# Patient Record
Sex: FEMALE | Race: BLACK OR AFRICAN AMERICAN | NOT HISPANIC OR LATINO
[De-identification: names, ages, dates, MRNs, and addresses within clinical notes are randomized per-mention and may not be internally consistent; named-entity substitution may affect disease eponyms.]

---

## 2019-06-02 ENCOUNTER — TRANSCRIPTION ENCOUNTER (OUTPATIENT)
Age: 59
End: 2019-06-02

## 2019-06-03 ENCOUNTER — OUTPATIENT (OUTPATIENT)
Dept: OUTPATIENT SERVICES | Facility: HOSPITAL | Age: 59
LOS: 1 days | End: 2019-06-03
Payer: COMMERCIAL

## 2019-06-03 VITALS
OXYGEN SATURATION: 100 % | HEIGHT: 61 IN | DIASTOLIC BLOOD PRESSURE: 79 MMHG | TEMPERATURE: 98 F | HEART RATE: 94 BPM | WEIGHT: 119.49 LBS | SYSTOLIC BLOOD PRESSURE: 136 MMHG | RESPIRATION RATE: 15 BRPM

## 2019-06-03 VITALS
OXYGEN SATURATION: 98 % | HEART RATE: 97 BPM | RESPIRATION RATE: 20 BRPM | SYSTOLIC BLOOD PRESSURE: 117 MMHG | DIASTOLIC BLOOD PRESSURE: 76 MMHG

## 2019-06-03 DIAGNOSIS — E11.3521 TYPE 2 DIABETES MELLITUS WITH PROLIFERATIVE DIABETIC RETINOPATHY WITH TRACTION RETINAL DETACHMENT INVOLVING THE MACULA, RIGHT EYE: ICD-10-CM

## 2019-06-03 DIAGNOSIS — Z95.5 PRESENCE OF CORONARY ANGIOPLASTY IMPLANT AND GRAFT: Chronic | ICD-10-CM

## 2019-06-03 DIAGNOSIS — H43.11 VITREOUS HEMORRHAGE, RIGHT EYE: ICD-10-CM

## 2019-06-03 DIAGNOSIS — Z98.49 CATARACT EXTRACTION STATUS, UNSPECIFIED EYE: Chronic | ICD-10-CM

## 2019-06-03 LAB — GLUCOSE BLDC GLUCOMTR-MCNC: 130 MG/DL — HIGH (ref 70–99)

## 2019-06-03 PROCEDURE — C1889: CPT

## 2019-06-03 PROCEDURE — 67113 REPAIR RETINAL DETACH CPLX: CPT | Mod: RT

## 2019-06-03 PROCEDURE — C9257: CPT

## 2019-06-03 PROCEDURE — 82962 GLUCOSE BLOOD TEST: CPT

## 2019-06-03 PROCEDURE — C1814: CPT

## 2019-06-03 RX ORDER — BEVACIZUMAB 400 MG/16ML
0.05 INJECTION, SOLUTION INTRAVENOUS ONCE
Refills: 0 | Status: DISCONTINUED | OUTPATIENT
Start: 2019-06-03 | End: 2019-06-18

## 2019-06-03 NOTE — ASU DISCHARGE PLAN (ADULT/PEDIATRIC) - PROCEDURE
right eye pars plana vitrectomy, injection of avastin, endolaser, membrane peel, injection of silicone oil

## 2019-06-03 NOTE — ASU DISCHARGE PLAN (ADULT/PEDIATRIC) - CALL YOUR DOCTOR IF YOU HAVE ANY OF THE FOLLOWING:
Pain not relieved by Medications/Nausea and vomiting that does not stop/Bleeding that does not stop/Swelling that gets worse/Fever greater than (need to indicate Fahrenheit or Celsius)

## 2019-06-03 NOTE — ASU DISCHARGE PLAN (ADULT/PEDIATRIC) - NURSING INSTRUCTIONS
Do not rub the eye. Tylenol or extra strength tylenol if needed for discomfort, avoid   Advil, Motrin, Aleve and aspirin to minimize bleeding. Appointment with Dr. Norman on 6/4/19 @ 10:30am

## 2019-06-03 NOTE — ASU DISCHARGE PLAN (ADULT/PEDIATRIC) - CARE PROVIDER_API CALL
Baldemar Norman)  Ophthalmology  [address] [place] [id number]  Phone: [phone]  Fax: [phone]  Follow Up Time:

## 2019-09-05 ENCOUNTER — APPOINTMENT (OUTPATIENT)
Dept: OPHTHALMOLOGY | Facility: CLINIC | Age: 59
End: 2019-09-05
Payer: COMMERCIAL

## 2019-09-05 ENCOUNTER — NON-APPOINTMENT (OUTPATIENT)
Age: 59
End: 2019-09-05

## 2019-09-05 PROCEDURE — 66821 AFTER CATARACT LASER SURGERY: CPT | Mod: RT

## 2019-09-06 PROBLEM — I21.9 ACUTE MYOCARDIAL INFARCTION, UNSPECIFIED: Chronic | Status: ACTIVE | Noted: 2019-06-03

## 2019-09-06 PROBLEM — I10 ESSENTIAL (PRIMARY) HYPERTENSION: Chronic | Status: ACTIVE | Noted: 2019-06-03

## 2019-09-06 PROBLEM — E78.5 HYPERLIPIDEMIA, UNSPECIFIED: Chronic | Status: ACTIVE | Noted: 2019-06-03

## 2021-01-21 ENCOUNTER — EMERGENCY (EMERGENCY)
Facility: HOSPITAL | Age: 61
LOS: 1 days | Discharge: HOME | End: 2021-01-21
Attending: EMERGENCY MEDICINE
Payer: COMMERCIAL

## 2021-01-21 VITALS
SYSTOLIC BLOOD PRESSURE: 156 MMHG | RESPIRATION RATE: 18 BRPM | HEIGHT: 61 IN | WEIGHT: 119.93 LBS | TEMPERATURE: 98 F | HEART RATE: 92 BPM | OXYGEN SATURATION: 97 % | DIASTOLIC BLOOD PRESSURE: 85 MMHG

## 2021-01-21 DIAGNOSIS — Z98.49 CATARACT EXTRACTION STATUS, UNSPECIFIED EYE: Chronic | ICD-10-CM

## 2021-01-21 DIAGNOSIS — Z95.5 PRESENCE OF CORONARY ANGIOPLASTY IMPLANT AND GRAFT: Chronic | ICD-10-CM

## 2021-01-21 LAB
ALBUMIN SERPL ELPH-MCNC: 3.4 G/DL — SIGNIFICANT CHANGE UP (ref 3.3–5)
ALP SERPL-CCNC: 114 U/L — SIGNIFICANT CHANGE UP (ref 40–120)
ALT FLD-CCNC: 17 U/L — SIGNIFICANT CHANGE UP (ref 10–45)
ANION GAP SERPL CALC-SCNC: 8 MMOL/L — SIGNIFICANT CHANGE UP (ref 5–17)
APTT BLD: 30.1 SEC — SIGNIFICANT CHANGE UP (ref 27.5–35.5)
AST SERPL-CCNC: 19 U/L — SIGNIFICANT CHANGE UP (ref 10–40)
BASE EXCESS BLDV CALC-SCNC: 1 MMOL/L — SIGNIFICANT CHANGE UP (ref -2–2)
BASOPHILS # BLD AUTO: 0.06 K/UL — SIGNIFICANT CHANGE UP (ref 0–0.2)
BASOPHILS NFR BLD AUTO: 0.7 % — SIGNIFICANT CHANGE UP (ref 0–2)
BILIRUB SERPL-MCNC: 0.3 MG/DL — SIGNIFICANT CHANGE UP (ref 0.2–1.2)
BUN SERPL-MCNC: 19 MG/DL — SIGNIFICANT CHANGE UP (ref 7–23)
CALCIUM SERPL-MCNC: 8.8 MG/DL — SIGNIFICANT CHANGE UP (ref 8.4–10.5)
CHLORIDE SERPL-SCNC: 105 MMOL/L — SIGNIFICANT CHANGE UP (ref 96–108)
CO2 BLDV-SCNC: 28 MMOL/L — SIGNIFICANT CHANGE UP (ref 22–30)
CO2 SERPL-SCNC: 26 MMOL/L — SIGNIFICANT CHANGE UP (ref 22–31)
CREAT SERPL-MCNC: 0.88 MG/DL — SIGNIFICANT CHANGE UP (ref 0.5–1.3)
EOSINOPHIL # BLD AUTO: 0.2 K/UL — SIGNIFICANT CHANGE UP (ref 0–0.5)
EOSINOPHIL NFR BLD AUTO: 2.3 % — SIGNIFICANT CHANGE UP (ref 0–6)
GLUCOSE BLDC GLUCOMTR-MCNC: 255 MG/DL — HIGH (ref 70–99)
GLUCOSE BLDC GLUCOMTR-MCNC: 280 MG/DL — HIGH (ref 70–99)
GLUCOSE SERPL-MCNC: 234 MG/DL — HIGH (ref 70–99)
HCO3 BLDV-SCNC: 26 MMOL/L — SIGNIFICANT CHANGE UP (ref 21–29)
HCT VFR BLD CALC: 40.1 % — SIGNIFICANT CHANGE UP (ref 34.5–45)
HGB BLD-MCNC: 13.1 G/DL — SIGNIFICANT CHANGE UP (ref 11.5–15.5)
IMM GRANULOCYTES NFR BLD AUTO: 0.5 % — SIGNIFICANT CHANGE UP (ref 0–1.5)
INR BLD: 0.96 RATIO — SIGNIFICANT CHANGE UP (ref 0.88–1.16)
LYMPHOCYTES # BLD AUTO: 3.12 K/UL — SIGNIFICANT CHANGE UP (ref 1–3.3)
LYMPHOCYTES # BLD AUTO: 35.9 % — SIGNIFICANT CHANGE UP (ref 13–44)
MCHC RBC-ENTMCNC: 28.1 PG — SIGNIFICANT CHANGE UP (ref 27–34)
MCHC RBC-ENTMCNC: 32.7 GM/DL — SIGNIFICANT CHANGE UP (ref 32–36)
MCV RBC AUTO: 86.1 FL — SIGNIFICANT CHANGE UP (ref 80–100)
MONOCYTES # BLD AUTO: 0.37 K/UL — SIGNIFICANT CHANGE UP (ref 0–0.9)
MONOCYTES NFR BLD AUTO: 4.3 % — SIGNIFICANT CHANGE UP (ref 2–14)
NEUTROPHILS # BLD AUTO: 4.89 K/UL — SIGNIFICANT CHANGE UP (ref 1.8–7.4)
NEUTROPHILS NFR BLD AUTO: 56.3 % — SIGNIFICANT CHANGE UP (ref 43–77)
NRBC # BLD: 0 /100 WBCS — SIGNIFICANT CHANGE UP (ref 0–0)
PCO2 BLDV: 47 MMHG — SIGNIFICANT CHANGE UP (ref 35–50)
PH BLDV: 7.36 — SIGNIFICANT CHANGE UP (ref 7.35–7.45)
PLATELET # BLD AUTO: 275 K/UL — SIGNIFICANT CHANGE UP (ref 150–400)
PO2 BLDV: 37 MMHG — SIGNIFICANT CHANGE UP (ref 25–45)
POTASSIUM SERPL-MCNC: 4.4 MMOL/L — SIGNIFICANT CHANGE UP (ref 3.5–5.3)
POTASSIUM SERPL-SCNC: 4.4 MMOL/L — SIGNIFICANT CHANGE UP (ref 3.5–5.3)
PROT SERPL-MCNC: 6.7 G/DL — SIGNIFICANT CHANGE UP (ref 6–8.3)
PROTHROM AB SERPL-ACNC: 11.5 SEC — SIGNIFICANT CHANGE UP (ref 10.6–13.6)
RBC # BLD: 4.66 M/UL — SIGNIFICANT CHANGE UP (ref 3.8–5.2)
RBC # FLD: 13.2 % — SIGNIFICANT CHANGE UP (ref 10.3–14.5)
SAO2 % BLDV: 66 % — LOW (ref 67–88)
SARS-COV-2 RNA SPEC QL NAA+PROBE: SIGNIFICANT CHANGE UP
SODIUM SERPL-SCNC: 139 MMOL/L — SIGNIFICANT CHANGE UP (ref 135–145)
TROPONIN T, HIGH SENSITIVITY RESULT: 19 NG/L — SIGNIFICANT CHANGE UP (ref 0–51)
TROPONIN T, HIGH SENSITIVITY RESULT: 22 NG/L — SIGNIFICANT CHANGE UP (ref 0–51)
WBC # BLD: 8.68 K/UL — SIGNIFICANT CHANGE UP (ref 3.8–10.5)
WBC # FLD AUTO: 8.68 K/UL — SIGNIFICANT CHANGE UP (ref 3.8–10.5)

## 2021-01-21 PROCEDURE — 70496 CT ANGIOGRAPHY HEAD: CPT | Mod: 26,MA

## 2021-01-21 PROCEDURE — 99285 EMERGENCY DEPT VISIT HI MDM: CPT

## 2021-01-21 PROCEDURE — 99283 EMERGENCY DEPT VISIT LOW MDM: CPT | Mod: GC

## 2021-01-21 PROCEDURE — 93010 ELECTROCARDIOGRAM REPORT: CPT

## 2021-01-21 PROCEDURE — 71045 X-RAY EXAM CHEST 1 VIEW: CPT | Mod: 26

## 2021-01-21 PROCEDURE — 70498 CT ANGIOGRAPHY NECK: CPT | Mod: 26,MA

## 2021-01-21 RX ORDER — INSULIN LISPRO 100/ML
1 VIAL (ML) SUBCUTANEOUS ONCE
Refills: 0 | Status: COMPLETED | OUTPATIENT
Start: 2021-01-21 | End: 2021-01-21

## 2021-01-21 RX ORDER — GLUCAGON INJECTION, SOLUTION 0.5 MG/.1ML
1 INJECTION, SOLUTION SUBCUTANEOUS ONCE
Refills: 0 | Status: DISCONTINUED | OUTPATIENT
Start: 2021-01-21 | End: 2021-01-25

## 2021-01-21 RX ORDER — MECLIZINE HCL 12.5 MG
25 TABLET ORAL EVERY 8 HOURS
Refills: 0 | Status: DISCONTINUED | OUTPATIENT
Start: 2021-01-21 | End: 2021-01-25

## 2021-01-21 RX ORDER — INSULIN LISPRO 100/ML
VIAL (ML) SUBCUTANEOUS
Refills: 0 | Status: DISCONTINUED | OUTPATIENT
Start: 2021-01-21 | End: 2021-01-25

## 2021-01-21 RX ORDER — DEXTROSE 50 % IN WATER 50 %
25 SYRINGE (ML) INTRAVENOUS ONCE
Refills: 0 | Status: DISCONTINUED | OUTPATIENT
Start: 2021-01-21 | End: 2021-01-25

## 2021-01-21 RX ORDER — DEXTROSE 50 % IN WATER 50 %
12.5 SYRINGE (ML) INTRAVENOUS ONCE
Refills: 0 | Status: DISCONTINUED | OUTPATIENT
Start: 2021-01-21 | End: 2021-01-25

## 2021-01-21 RX ORDER — ATORVASTATIN CALCIUM 80 MG/1
80 TABLET, FILM COATED ORAL AT BEDTIME
Refills: 0 | Status: DISCONTINUED | OUTPATIENT
Start: 2021-01-21 | End: 2021-01-25

## 2021-01-21 RX ORDER — SODIUM CHLORIDE 9 MG/ML
1000 INJECTION, SOLUTION INTRAVENOUS
Refills: 0 | Status: DISCONTINUED | OUTPATIENT
Start: 2021-01-21 | End: 2021-01-25

## 2021-01-21 RX ORDER — MECLIZINE HCL 12.5 MG
50 TABLET ORAL ONCE
Refills: 0 | Status: COMPLETED | OUTPATIENT
Start: 2021-01-21 | End: 2021-01-21

## 2021-01-21 RX ORDER — DEXTROSE 50 % IN WATER 50 %
15 SYRINGE (ML) INTRAVENOUS ONCE
Refills: 0 | Status: DISCONTINUED | OUTPATIENT
Start: 2021-01-21 | End: 2021-01-25

## 2021-01-21 RX ORDER — SODIUM CHLORIDE 9 MG/ML
3 INJECTION INTRAMUSCULAR; INTRAVENOUS; SUBCUTANEOUS EVERY 8 HOURS
Refills: 0 | Status: DISCONTINUED | OUTPATIENT
Start: 2021-01-21 | End: 2021-01-25

## 2021-01-21 RX ORDER — ASPIRIN/CALCIUM CARB/MAGNESIUM 324 MG
81 TABLET ORAL DAILY
Refills: 0 | Status: DISCONTINUED | OUTPATIENT
Start: 2021-01-21 | End: 2021-01-25

## 2021-01-21 RX ORDER — CLOPIDOGREL BISULFATE 75 MG/1
75 TABLET, FILM COATED ORAL DAILY
Refills: 0 | Status: DISCONTINUED | OUTPATIENT
Start: 2021-01-21 | End: 2021-01-25

## 2021-01-21 RX ORDER — AMLODIPINE BESYLATE 2.5 MG/1
5 TABLET ORAL DAILY
Refills: 0 | Status: DISCONTINUED | OUTPATIENT
Start: 2021-01-21 | End: 2021-01-25

## 2021-01-21 RX ORDER — LISINOPRIL 2.5 MG/1
40 TABLET ORAL DAILY
Refills: 0 | Status: DISCONTINUED | OUTPATIENT
Start: 2021-01-21 | End: 2021-01-25

## 2021-01-21 RX ADMIN — SODIUM CHLORIDE 3 MILLILITER(S): 9 INJECTION INTRAMUSCULAR; INTRAVENOUS; SUBCUTANEOUS at 21:08

## 2021-01-21 RX ADMIN — Medication 1 UNIT(S): at 21:07

## 2021-01-21 RX ADMIN — Medication 50 MILLIGRAM(S): at 14:07

## 2021-01-21 NOTE — ED PROVIDER NOTE - NEUROLOGICAL SPEECH
Patient Education        Back Stretches: Exercises  Your Care Instructions  Here are some examples of exercises for stretching your back. Start each exercise slowly. Ease off the exercise if you start to have pain. Your doctor or physical therapist will tell you when you can start these exercises and which ones will work best for you. How to do the exercises  Overhead stretch    1. Stand comfortably with your feet shoulder-width apart. 2. Looking straight ahead, raise both arms over your head and reach toward the ceiling. Do not allow your head to tilt back. 3. Hold for 15 to 30 seconds, then lower your arms to your sides. 4. Repeat 2 to 4 times. Side stretch    1. Stand comfortably with your feet shoulder-width apart. 2. Raise one arm over your head, and then lean to the other side. 3. Slide your hand down your leg as you let the weight of your arm gently stretch your side muscles. Hold for 15 to 30 seconds. 4. Repeat 2 to 4 times on each side. Press-up    1. Lie on your stomach, supporting your body with your forearms. 2. Press your elbows down into the floor to raise your upper back. As you do this, relax your stomach muscles and allow your back to arch without using your back muscles. As your press up, do not let your hips or pelvis come off the floor. 3. Hold for 15 to 30 seconds, then relax. 4. Repeat 2 to 4 times. Relax and rest    1. Lie on your back with a rolled towel under your neck and a pillow under your knees. Extend your arms comfortably to your sides. 2. Relax and breathe normally. 3. Remain in this position for about 10 minutes. 4. If you can, do this 2 or 3 times each day. Follow-up care is a key part of your treatment and safety. Be sure to make and go to all appointments, and call your doctor if you are having problems. It's also a good idea to know your test results and keep a list of the medicines you take. Where can you learn more?   Go to rotation with towel    5. Hold a towel above and behind your head with the arm that is not sore. 6. With your sore arm, reach behind your back and grasp the towel. 7. With the arm above your head, pull the towel upward. Do this until you feel a stretch on the front and outside of your sore shoulder. 8. Hold 15 to 30 seconds. 9. Repeat 2 to 4 times. Shoulder blade squeeze    5. Stand with your arms at your sides, and squeeze your shoulder blades together. Do not raise your shoulders up as you squeeze. 6. Hold 6 seconds. 7. Repeat 8 to 12 times. Resisted rows    For this exercise, you will need elastic exercise material, such as surgical tubing or Thera-Band. 1. Put the band around a solid object at about waist level. (A bedpost will work well.) Each hand should hold an end of the band. 2. With your elbows at your sides and bent to 90 degrees, pull the band back. Your shoulder blades should move toward each other. Return to the starting position. 3. Repeat 8 to 12 times. External rotator strengthening exercise    1. Start by tying a piece of elastic exercise material to a doorknob. You can use surgical tubing or Thera-Band. (You may also hold one end of the band in each hand.)  2. Stand or sit with your shoulder relaxed and your elbow bent 90 degrees. Your upper arm should rest comfortably against your side. Squeeze a rolled towel between your elbow and your body for comfort. This will help keep your arm at your side. 3. Hold one end of the elastic band with the hand of the painful arm. 4. Start with your forearm across your belly. Slowly rotate the forearm out away from your body. Keep your elbow and upper arm tucked against the towel roll or the side of your body until you begin to feel tightness in your shoulder. Slowly move your arm back to where you started. 5. Repeat 8 to 12 times. Internal rotator strengthening exercise    1. Start by tying a piece of elastic exercise material to a doorknob.  You clear

## 2021-01-21 NOTE — ED PROVIDER NOTE - PHYSICAL EXAMINATION
Gen: Well appearing, NAD  Head: NCAT  HEENT: PERRL, MMM, normal conjunctiva, anicteric, neck supple  Lung: CTAB, no adventitious sounds  CV: RRR, no murmurs  Abd: soft, NTND, no rebound or guarding, no CVAT  MSK: No edema, no visible deformities  Neuro: R eye strabismus (chronic per pt, no vision in eye) otherwise CN II-XII grossly intact. 5/5 strength and normal sensation in all extremities. Unable to heel/toe walk, falling to R  Skin: Warm and dry, no evidence of rash Gen: Well appearing, NAD  Head: NCAT  HEENT: PERRL, MMM, normal conjunctiva, anicteric, neck supple  Lung: CTAB, no adventitious sounds  CV: RRR, no murmurs  Abd: soft, NTND, no rebound or guarding, no CVAT  MSK: No edema, no visible deformities  Neuro: R eye strabismus (chronic per pt, no vision in eye) otherwise CN II-XII grossly intact. 5/5 strength and normal sensation in all extremities. Unable to heel/toe walk, falling to R  Skin: Warm and dry, no evidence of rash  **ATTENDING ADDENDUM (Dr. Bradley Singh): I have reviewed and substantially contributed to the elements of the PE as documented above. I have directly performed an examination of this patient in conjunction with the other members (EM resident/PA/NP) of the patient care team. I have personally reviewed the patient's vital signs at the time of the patient's initial presentation to the ED and repeatedly throughout the ED course. Gen: Well appearing, NAD  Head: NCAT  HEENT: PERRL, MMM, normal conjunctiva, anicteric, neck supple  Lung: CTAB, no adventitious sounds  CV: RRR, no murmurs  Abd: soft, NTND, no rebound or guarding, no CVAT  MSK: No edema, no visible deformities  Neuro: R eye strabismus (chronic per pt, no vision in eye) otherwise CN II-XII grossly intact. 5/5 strength and normal sensation in all extremities. Unable to heel/toe walk, falling to R [**ATTENDING ADDENDUM (Dr. Bradley Singh): please see clarification re: CN 2-4, CN 6, and CN 7, below, in ED attending addendum]   Skin: Warm and dry, no evidence of rash  **ATTENDING ADDENDUM (Dr. Bradley Singh): I have reviewed and substantially contributed to the elements of the PE as documented above. I have directly performed an examination of this patient in conjunction with the other members (EM resident/PA/NP) of the patient care team. I have personally reviewed the patient's vital signs at the time of the patient's initial presentation to the ED and repeatedly throughout the ED course. Of note, and in addition to the above, the patient has a chronic right eyelid ptosis from prior surgical intervention. Mild ?right sided droop? (chronic?)

## 2021-01-21 NOTE — ED CDU PROVIDER INITIAL DAY NOTE - EYE ANTERIOR CHAMBER, RIGHT
**ATTENDING ADDENDUM (Dr. Bradley Singh): unable to assess/evaluate **CDU ATTENDING ADDENDUM (Dr. Bradley Singh): unable to assess/evaluate **CDU ATTENDING ADDENDUM (Dr. Bradley Singh): NO hyphema or hypopion.

## 2021-01-21 NOTE — ED CDU PROVIDER INITIAL DAY NOTE - OBJECTIVE STATEMENT
61yo F hx htn hld dm CAD s/p stent, R "retinal surgery" p/w insidious onset loss of balance (feels self falling to R) since 4pm yesterday, worsens w/ movement, and [facility] and sent to ED. Also notes that she has been having productive cough recently. No fever,chills, cp, sob, abd pain, n/v/d. Thinks her R arm/leg may be weaker.    In the ED VSS.  Labs unremarkable.  CT head, CTA head and neck negative for acute pathology.  Neuro recommending observation in the CDU overnight for MRI. 61yo F hx htn hld dm CAD s/p stent, R "retinal surgery" p/w insidious onset loss of balance (feels self falling to R) since 4pm yesterday, [facility]sens w/ movement[facility] Also notes that she has been having productive cough recently. No fever,chills, cp, sob, abd pain, n/v/d. Thinks her R arm/leg may be weaker.  In the ED VSS.  Labs unremarkable.  CT head, CTA head and neck negative for acute pathology.  Neuro recommending observation in the CDU overnight for MRI.  **CDU ATTENDING ADDENDUM (Dr. Bradley Singh): I attest that I have directly examined this patient and elicited a comparable history of present illness and review of systems with my collaborating provider (NP/PA). I attest that I have made significant contributions to the documentation where necessary and as noted in the EMR. Of note, and in addition to the above, I was the initial ED Provider of record for this patient's presentation prior to the CDU admission. Please refer to ED Provider Note for additional details.

## 2021-01-21 NOTE — ED PROVIDER NOTE - EYE ANTERIOR CHAMBER, RIGHT
**ATTENDING ADDENDUM (Dr. Bradley Singh): unable to assess/evaluate **ATTENDING ADDENDUM (Dr. Bradley Singh): NO obvious hyphema or hypopion.

## 2021-01-21 NOTE — ED CDU PROVIDER INITIAL DAY NOTE - EYE EXTRAOCULAR MOVEMENT, RIGHT
**ATTENDING ADDENDUM (Dr. Bradley Singh): chronic esotropia/DISCONJUGATE GAZE **CDU ATTENDING ADDENDUM (Dr. Bradley Singh): chronic esotropia/DISCONJUGATE GAZE

## 2021-01-21 NOTE — ED PROVIDER NOTE - EYE, RIGHT
**ATTENDING ADDENDUM (Dr. Bradley Singh): chronic scarring, ?blindness **ATTENDING ADDENDUM (Dr. 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

## 2021-01-21 NOTE — ED PROVIDER NOTE - UNABLE TO OBTAIN
**ATTENDING ADDENDUM (Dr. Bradley Singh): Exploration of CC, HPI and review of systems limited by patient's acuity (CODE STROKE/STROKE TEAM ACTIVATION) Urgent need for Intervention

## 2021-01-21 NOTE — ED PROVIDER NOTE - PLAN OF CARE
**ATTENDING ADDENDUM (Dr. Bradley Singh): Goals of care include resolution of emergent/urgent symptoms and concerns, and restoration to baseline level of homeostasis.

## 2021-01-21 NOTE — ED ADULT NURSE NOTE - NSIMPLEMENTINTERV_GEN_ALL_ED
Implemented All Fall Risk Interventions:  [place] to call system. Call bell, personal items and telephone within reach. Instruct patient to call for assistance. Room bathroom lighting operational. Non-slip footwear when patient is off stretcher. Physically safe environment: no spills, clutter or unnecessary equipment. Stretcher in lowest position, wheels locked, appropriate side rails in place. Provide visual cue, wrist band, yellow gown, etc. Monitor gait and stability. Monitor for mental status changes and reorient to person, place, and time. Review medications for side effects contributing to fall risk. Reinforce activity limits and safety measures with patient and family.

## 2021-01-21 NOTE — ED CDU PROVIDER INITIAL DAY NOTE - PROGRESS NOTE DETAILS
Patient reports that she wants to leave the hospital.  She states that she is hungry and want to be closer to her family.  Patient offered sandwich, but still requesting to be discharged. Explained risks of leaving, including worsening neurologic deficits and death.  Patient understands and accepts these risk and would still like to leave AMA. -Everardo Barron PA-C Patient states that she would like to stay in the hospital after receiving a sandwich. Patient no other complaints. Endorses unsteady gait. Neuro exam: A+Ox3, EOMI, PERRL, equal strength b/l UE and LE, sensation equal b/l.  Patient's home medications: Metformin 500mg BID, Glipizide BID, Atorvastatin 80mg, Ramipril 10mg, ASA 81mg, Clopidogrel 75mg, Amlodipine 5mg. - iCelo Iqbal PA-C Spoke with neurology, no need for permissive HTN at this time, OK with giving patient home medications for gradual decline in BP. - ADALI LopezC Patient states that she would like to stay in the hospital after receiving a sandwich. Patient no other complaints. Endorses unsteady gait.  A+Ox3, R eye deviated post surgical and able to move. L eye EOMI. L pupil responsive to light. Strength 5/5 B/l UE and B/l LE, sensation intact.  Patient's home medications: Metformin 500mg BID, Glipizide BID, Atorvastatin 80mg, Ramipril 10mg, ASA 81mg, Clopidogrel 75mg, Amlodipine 5mg. - Cielo Iqbal PA-C **CDU ATTENDING ADDENDUM (Dr. Bradley Singh): patient serially evaluated throughout CDU course by CDU team. Personally assessed. Currently resting comfortably. NO acute deterioration up to this time in the CDU. Will continue to observe and monitor closely. Will reassess later in shift.

## 2021-01-21 NOTE — ED PROVIDER NOTE - ATTENDING CONTRIBUTION TO CARE
**ATTENDING ADDENDUM (Dr. Bradley Singh): I attest that I have directly examined this patient and reviewed and formulated the diagnostic and therapeutic management plan in collaboration with the EM resident. Please see MDM note and remainder of EMR for findings from CC, HPI, ROS, and PE. (Tonio)

## 2021-01-21 NOTE — ED CDU PROVIDER DISPOSITION NOTE - NSFOLLOWUPINSTRUCTIONS_ED_ALL_ED_FT
1. Rest. Stay hydrated.   2. Continue your current home medications. You can take Meclizine 25mg as needed up to three time a day for dizziness.   3. Follow-up with your medical doctor in 2-3 days.  Bring your results with you for follow-up.  4. Return to the ER if you have any new or worsening symptoms, falls,  chest pain, difficulty breathing, fevers, chills, weakness, or any other concerns.      ***NEURO RECCS 1. Rest. Stay hydrated.   2. Continue your current home medications.  3. Follow-up with your medical doctor in 2-3 days.  Bring your results with you for follow-up.      Follow up with neurology, Dr. Joe upon discharge    Thomas Joe)  Neurology; Vascular Neurology  [address] [facility] [address], [address]  [place] [address]  Phone: [phone]  Fax: [phone]    4. Return to the ER if you have any new or worsening symptoms, falls,  chest pain, difficulty breathing, fevers, chills, weakness, or any other concerns.

## 2021-01-21 NOTE — ED PROVIDER NOTE - EYE VISUAL FIELD, RIGHT
diminished **ATTENDING ADDENDUM (Dr. Bradley Singh): unable to assess/evaluate/diminished **ATTENDING ADDENDUM (Dr. Bradley Singh): POSITIVE chronic right eye blindness./diminished

## 2021-01-21 NOTE — ED PROVIDER NOTE - PRINCIPAL DIAGNOSIS
Fibroid uterus    H/O lumpectomy  left  History of incisional hernia repair    History of kidney transplant  left  History of Mohs surgery for squamous cell carcinoma of skin  recent left arm procedure-multiple Moh's procedure in the past Imbalance

## 2021-01-21 NOTE — ED CDU PROVIDER INITIAL DAY NOTE - EYE, RIGHT
**ATTENDING ADDENDUM (Dr. Bradley Singh): chronic scarring, ?blindness **CDU ATTENDING ADDENDUM (Dr. Bradley Singh): chronic scarring, ?blindness **CDU ATTENDING ADDENDUM (Dr. Bradley Singh): chronic right eye blindness

## 2021-01-21 NOTE — CONSULT NOTE ADULT - SUBJECTIVE AND OBJECTIVE BOX
Neurology  Consult Note  01-21-21    Name:  NANCY BRUSH; 60y (1960)    Reason for Admission:     Chief Complaint:  Vertigo    HPI:  61yo AA woman with a PMHx of HTN HLD DM CAD s/p stents, R retinal detachment s/p corrective surgery, presents with complaints of loss of balance of insidious onset w/o focal weakness or sensory changes. Poor historian. Patient reports that internal sense of dizziness began 48hrs ago. Denies vertiginous symptoms. States that symptoms worsen with head movement. Intermittent. Imbalance prompted  visit who sent patient to the ED.   In the ED, VSS, labs grossly WNL, CTH/CTA show no acute disease processes.   Patient currently denies fevers, chills, ns, cp, sob, abd pain, n/v/d/c, weakness, or changes to weight or senses.     Review of Systems:  As states in HPI.    Allergies:  eggs (Unknown)      PMHx:   HTN (hypertension)    HLD (hyperlipidemia)    Heart attack      PFHx:     PSuHx:   Cataract extraction status    Stented coronary artery      PSoHx:     Marital Status:  (   )    (   ) Single    (   )    (  )   Occupation:   Lives with: (  ) alone  (  ) children   (  ) spouse   (  ) parents  (  ) other  Recent Travel:     Substance Use (street drugs): (  ) never used  (  ) other:  Tobacco Usage:  (   ) never smoked   (   ) former smoker   (   ) current smoker  (     ) pack year  Alcohol Usage:  Sexual History:         Medications:  MEDICATIONS  (STANDING):    MEDICATIONS  (PRN):    Vitals:  T(C): 36.6 (01-21-21 @ 13:27), Max: 36.6 (01-21-21 @ 12:36)  HR: 87 (01-21-21 @ 14:07) (82 - 92)  BP: 171/97 (01-21-21 @ 14:07) (150/83 - 171/97)  RR: 15 (01-21-21 @ 14:07) (15 - 20)  SpO2: 100% (01-21-21 @ 14:07) (97% - 100%)    Labs:                        13.1   8.68  )-----------( 275      ( 21 Jan 2021 13:10 )             40.1     01-21    139  |  105  |  19  ----------------------------<  234<H>  4.4   |  26  |  0.88    Ca    8.8      21 Jan 2021 13:10    TPro  6.7  /  Alb  3.4  /  TBili  0.3  /  DBili  x   /  AST  19  /  ALT  17  /  AlkPhos  114  01-21    CAPILLARY BLOOD GLUCOSE      POCT Blood Glucose.: 221 mg/dL (21 Jan 2021 12:52)    LIVER FUNCTIONS - ( 21 Jan 2021 13:10 )  Alb: 3.4 g/dL / Pro: 6.7 g/dL / ALK PHOS: 114 U/L / ALT: 17 U/L / AST: 19 U/L / GGT: x             PT/INR - ( 21 Jan 2021 13:10 )   PT: 11.5 sec;   INR: 0.96 ratio         PTT - ( 21 Jan 2021 13:10 )  PTT:30.1 sec      PHYSICAL EXAMINATION:  General: Well-developed, well nourished, in no acute distress.  Eyes: Conjunctiva and sclera clear.  Neurologic:  - Mental Status:  Alert, awake, oriented to person, place, and time; Speech is fluent with intact naming, repetition, and comprehension; Good overall fund of knowledge;   - Cranial Nerves II-XII:    II:  Right eye surgical, deviated to right, able to move. Left visual fields are full to confrontation; Pupil is round, and reactive to light;  III, IV, VI:  Extraocular movements in L. are intact without nystagmus.  V:  Facial sensation is intact in the V1-V3 distribution bilaterally.  VII:  Face is symmetric with normal eye closure and smile  - Motor:  Strength is 5/5 throughout.  There is no pronator drift.  Normal muscle bulk and tone throughout.  - Reflexes:  2+ and symmetric at the biceps, triceps, brachioradialis, knees, and ankles.  Plantar responses flexor.  - Sensory:  Intact throughout to LT  - Coordination:  Finger-nose-finger and heel-knee-shin intact without dysmetria.  Rapid alternating hand movements intact.      Radiology:  < from: CT Angio Neck w/ IV Cont (01.21.21 @ 13:35) >  IMPRESSION: Old small vessel white matter ischemic changes. 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 No hemorrhage.    Normal CTA of the head and neck.    Dr. Bradley discussed these findings with neurology resident on 1/21/2021 1:25 PM with read back.    MARITN BRADLEY MD, ATTENDING RADIOLOGIST  This document has been electronically signed. Jan 21 2021  1:28PM    < end of copied text >

## 2021-01-21 NOTE — ED CDU PROVIDER INITIAL DAY NOTE - NEUROLOGICAL PRONATOR DRIFT
**ATTENDING ADDENDUM (Dr. Bradley Singh): NO drift./none **CDU ATTENDING ADDENDUM (Dr. Bradley Singh): NO drift./none

## 2021-01-21 NOTE — CONSULT NOTE ADULT - ATTENDING COMMENTS
61yo AA woman with a PMHx of HTN HLD DM CAD s/p stents, R retinal detachment s/p corrective surgery, p/w dizziness and instability with falls. Pt describes sense of imbalance and dizziness but not as room spinning or lightheadedness. Pt walks looking down because she fears tripping on things likes cracks on the ground. On exam she has minimal vision in the right, eye, EOMI, VFF in left eye, no other cranial nerve abnormalities. Strength 5/5 throughout. There is decreased sensation to vibration and proprioception in the toes and up to the knees.   MRI was negative for any new infarcts, did show old pontine infarct and microvascular ischemic disease.   Imbalance and sense of dizziness likely multifactorial due to diabetic peripheral neuropathy and visual impairment from retinal detachment s/p failed reattachment surgery.   Pt counseled on blood pressure and diabetes control to reduce change of further stroke and reduce neuropathy. Contiue on ASA and Plavix.

## 2021-01-21 NOTE — ED PROVIDER NOTE - EYE CORNEA, RIGHT
HAZY/CLOUDY/KERATITIS **ATTENDING ADDENDUM (Dr. Bradley Singh): unable to assess/evaluate/HAZY/CLOUDY/KERATITIS **ATTENDING ADDENDUM (Dr. Bradley Singh): POSITIVE APD, chronic right eye blindness from prior retinal detachment

## 2021-01-21 NOTE — ED CDU PROVIDER INITIAL DAY NOTE - EYE VISUAL FIELD, RIGHT
**ATTENDING ADDENDUM (Dr. Bradley Singh): unable to assess/evaluate/diminished **CDU ATTENDING ADDENDUM (Dr. Bradley Singh): unable to assess/evaluate/diminished **CDU ATTENDING ADDENDUM (Dr. Bradley Singh): POSITIVE APD, POSITIVE chronic right eye blindness/diminished

## 2021-01-21 NOTE — ED CDU PROVIDER INITIAL DAY NOTE - PMH
Heart attack  2015  HLD (hyperlipidemia)    HTN (hypertension)     Blind right eye    Heart attack  2015  HLD (hyperlipidemia)    HTN (hypertension)    Right retinal detachment    Type 2 diabetes mellitus

## 2021-01-21 NOTE — ED CDU PROVIDER INITIAL DAY NOTE - SKIN, MLM
Skin normal color for race, warm, dry and intact. No evidence of rash. Skin normal color for race, warm, dry and intact. No evidence of rash. **CDU ATTENDING ADDENDUM (Dr. Bradley Singh): NO rashes, lesions, ulcers, vesicles, erythema, streaking, lymphangitic spread, crepitus, cellulitis, petechiae, purpurae, track marks or ecchymoses.

## 2021-01-21 NOTE — ED CDU PROVIDER INITIAL DAY NOTE - ATTENDING CONTRIBUTION TO CARE
**ATTENDING ADDENDUM (Dr. Bradley Singh): I attest that I have directly examined this patient and reviewed and formulated the diagnostic and therapeutic management plan in collaboration with the advanced practitioner (NP, PA). Please see MDM note and remainder of EMR for findings from CC, HPI, ROS, and PE. **ATTENDING ADDENDUM (Dr. Bradley Singh): I attest that I have directly examined this patient and reviewed and formulated the diagnostic and therapeutic management plan in collaboration with the advanced practitioner (NP, PA). Please see MDM note and remainder of EMR for findings from CC, HPI, ROS, and PE. (Rasheed)

## 2021-01-21 NOTE — ED PROVIDER NOTE - PROGRESS NOTE DETAILS
**ATTENDING ADDENDUM (Dr. Bradley Singh): Agree with goals/plan of ED care as described in EMR, including diagnostics, therapeutics and consultation as clinically warranted. Agree with CODE STROKE/STROKE TEAM activation. Will continue to observe and monitor closely. **ATTENDING ADDENDUM (Dr. Bradley Singh): patient serially evaluated throughout ED course by ED team. NO acute deterioration up to this time in the ED. Personally reassessed. ED diagnostics up to this time acknowledged, reviewed and noted. May require physical therapy/occupational therapy consultation and admission to medicine (neurology recommendations unlikely to require neurology/stroke unit admission at this time). Will continue to observe and monitor closely. Anticipatory guidance provided. **ATTENDING ADDENDUM (Dr. Bradley Singh): patient serially evaluated throughout ED course. NO acute deterioration up to this time in the ED. Agree with admission to CDU. Will continue to observe and monitor closely until definitive placement is made. Anticipatory guidance provided to patient personally by me regarding need for continued observation for diagnostic uncertainty and therapeutic intensity.

## 2021-01-21 NOTE — ED CDU PROVIDER INITIAL DAY NOTE - MEDICAL DECISION MAKING DETAILS
**ATTENDING MEDICAL DECISION MAKING/SYNTHESIS (Dr. Bradley Singh): I have reviewed the Chief Concern(s), the HPI, the ROS, and have directly performed and confirmed the findings on the Physical Examination. I have reviewed the medical decision making with all providers, as applicable. The PROBLEM REPRESENTATION at this time is: 60-year-old woman with history of Hypertension, hyperlipidemia, old right eye ptosis and ?diminished vision O.D. secondary to old surgery-associated ptosis secondary to retinal detachment, and old pontine cerebrovascular accident (as seen on CT) now presenting with unsteady gait and concern for inability to independently gait within the past 24 hours. To CDU for diagnostic uncertainty and therapeutic intensity (MRI and reassessment by neurology). The MOST LIKELY DIAGNOSIS, and the LIST OF DIFFERENTIAL DIAGNOSES, includes (but is not limited to) the following: cerebrovascular accident, transient ischemic attack, vertebrobasilar insufficiency or equivalent, mass/tumor, serious bacterial infection or sepsis/severe sepsis e.g. meningococcemia, meningitis, encephalitis, or equivalent, dehydration, electrolyte-metabolic-endocrine derangements, vascular cause e.g. carotid dissection or equivalent, demyelinating disorder. The likelihood of each of these diagnoses has been appropriately considered in the context of this patient's presentation and my evaluation. PLAN: as described in EMR, including diagnostics, therapeutics and consultation as clinically warranted. I will continue to reevaluate the patient, including the results of all testing, and monitor response to therapy throughout the patient's course in the ED. **ATTENDING MEDICAL DECISION MAKING/SYNTHESIS (Dr. Bradley Singh): I have reviewed the Chief Concern(s), the HPI, the ROS, and have directly performed and confirmed the findings on the Physical Examination. I have reviewed the medical decision making with all providers, as applicable. The PROBLEM REPRESENTATION at this time is: 60-year-old woman with history of Hypertension, hyperlipidemia, old right eye ptosis and ?diminished vision O.D. secondary to old surgery-associated ptosis secondary to retinal detachment, and old pontine cerebrovascular accident (as seen on CT) now presenting with unsteady gait and concern for inability to independently gait within the past 24 hours. To CDU for diagnostic uncertainty and therapeutic intensity (MRI and reassessment by neurology). The MOST LIKELY DIAGNOSIS, and the LIST OF DIFFERENTIAL DIAGNOSES, includes (but is not limited to) the following: cerebrovascular accident, transient ischemic attack, vertebrobasilar insufficiency or equivalent, mass/tumor, serious bacterial infection or sepsis/severe sepsis e.g. meningococcemia, meningitis, encephalitis, or equivalent, dehydration, electrolyte-metabolic-endocrine derangements, vascular cause e.g. carotid dissection or equivalent, demyelinating disorder. The likelihood of each of these diagnoses has been appropriately considered in the context of this patient's presentation and my evaluation. PLAN: as described in EMR, including diagnostics, therapeutics and consultation as clinically warranted. I will continue to reevaluate the patient, including the results of all testing, and monitor response to therapy throughout the patient's course in the CDU. **ATTENDING MEDICAL DECISION MAKING/SYNTHESIS (Dr. Bradley Singh): I have reviewed the Chief Concern(s), the HPI, the ROS, and have directly performed and confirmed the findings on the Physical Examination. I have reviewed the medical decision making with all providers, as applicable. The PROBLEM REPRESENTATION at this time is: 60-year-old woman with history of Hypertension, hyperlipidemia, old right eye ptosis and diminished vision O.D. (blindness) secondary to old surgery-associated ptosis secondary to retinal detachment, and old pontine cerebrovascular accident (as seen on CT) now presenting with unsteady gait and concern for inability to independently gait within the past 24 hours. To CDU for diagnostic uncertainty and therapeutic intensity (MRI and reassessment by neurology). The MOST LIKELY DIAGNOSIS, and the LIST OF DIFFERENTIAL DIAGNOSES, includes (but is not limited to) the following: cerebrovascular accident, transient ischemic attack, vertebrobasilar insufficiency or equivalent, mass/tumor, serious bacterial infection or sepsis/severe sepsis e.g. meningococcemia, meningitis, encephalitis, or equivalent, dehydration, electrolyte-metabolic-endocrine derangements, vascular cause e.g. carotid dissection or equivalent, demyelinating disorder. The likelihood of each of these diagnoses has been appropriately considered in the context of this patient's presentation and my evaluation. PLAN: as described in EMR, including diagnostics, therapeutics and consultation as clinically warranted. I will continue to reevaluate the patient, including the results of all testing, and monitor response to therapy throughout the patient's course in the CDU.

## 2021-01-21 NOTE — ED PROVIDER NOTE - CLINICAL SUMMARY MEDICAL DECISION MAKING FREE TEXT BOX
Loss of balance within 24 hours w/ inability to ambulate w/ steady gait. Has significant pmhx will call code stroke and neuro eval Loss of balance within 24 hours w/ inability to ambulate w/ steady gait. Has significant pmhx will call code stroke and neuro eval  **ATTENDING MEDICAL DECISION MAKING/SYNTHESIS (Dr. Bradley Singh): I have reviewed the Chief Concern(s), the HPI, the ROS, and have directly performed and confirmed the findings on the Physical Examination. I have reviewed the medical decision making with all providers, as applicable. The PROBLEM REPRESENTATION at this time is: 60-year-old woman with history of Hypertension, hyperlipidemia, and old pontine cerebrovascular accident now presenting with unsteady gait and concern for inability to independently gait within the past 24 hours. The MOST LIKELY DIAGNOSIS, and the LIST OF DIFFERENTIAL DIAGNOSES, includes (but is not limited to) the following: cerebrovascular accident, transient ischemic attack, vertebrobasilar insufficiency or equivalent, mass/tumor, serious bacterial infection or sepsis/severe sepsis e.g. meningococcemia, meningitis, encephalitis, or equivalent, dehydration, electrolyte-metabolic-endocrine derangements, vascular cause e.g. carotid dissection or equivalent, demyelinating disorder. The likelihood of each of these diagnoses has been appropriately considered in the context of this patient's presentation and my evaluation. PLAN: as described in EMR, including diagnostics, therapeutics and consultation as clinically warranted. I will continue to reevaluate the patient, including the results of all testing, and monitor response to therapy throughout the patient's course in the ED. Loss of balance within 24 hours w/ inability to ambulate w/ steady gait. Has significant pmhx will call code stroke and neuro eval  **ATTENDING MEDICAL DECISION MAKING/SYNTHESIS (Dr. Bradley Singh): I have reviewed the Chief Concern(s), the HPI, the ROS, and have directly performed and confirmed the findings on the Physical Examination. I have reviewed the medical decision making with all providers, as applicable. The PROBLEM REPRESENTATION at this time is: 60-year-old woman with history of Hypertension, hyperlipidemia, old right eye ptosis and ?diminished vision O.D. secondary to old surgery-associated ptosis, and old pontine cerebrovascular accident? now presenting with unsteady gait and concern for inability to independently gait within the past 24 hours. The MOST LIKELY DIAGNOSIS, and the LIST OF DIFFERENTIAL DIAGNOSES, includes (but is not limited to) the following: cerebrovascular accident, transient ischemic attack, vertebrobasilar insufficiency or equivalent, mass/tumor, serious bacterial infection or sepsis/severe sepsis e.g. meningococcemia, meningitis, encephalitis, or equivalent, dehydration, electrolyte-metabolic-endocrine derangements, vascular cause e.g. carotid dissection or equivalent, demyelinating disorder. The likelihood of each of these diagnoses has been appropriately considered in the context of this patient's presentation and my evaluation. PLAN: as described in EMR, including diagnostics, therapeutics and consultation as clinically warranted. I will continue to reevaluate the patient, including the results of all testing, and monitor response to therapy throughout the patient's course in the ED. Loss of balance within 24 hours w/ inability to ambulate w/ steady gait. Has significant pmhx will call code stroke and neuro eval  **ATTENDING MEDICAL DECISION MAKING/SYNTHESIS (Dr. Bradley Singh): I have reviewed the Chief Concern(s), the HPI, the ROS, and have directly performed and confirmed the findings on the Physical Examination. I have reviewed the medical decision making with all providers, as applicable. The PROBLEM REPRESENTATION at this time is: 60-year-old woman with history of Hypertension, hyperlipidemia, old right eye ptosis and ?diminished vision O.D. secondary to old surgery-associated ptosis secondary to retinal detachment, and old pontine cerebrovascular accident (as seen on CT) now presenting with unsteady gait and concern for inability to independently gait within the past 24 hours. The MOST LIKELY DIAGNOSIS, and the LIST OF DIFFERENTIAL DIAGNOSES, includes (but is not limited to) the following: cerebrovascular accident, transient ischemic attack, vertebrobasilar insufficiency or equivalent, mass/tumor, serious bacterial infection or sepsis/severe sepsis e.g. meningococcemia, meningitis, encephalitis, or equivalent, dehydration, electrolyte-metabolic-endocrine derangements, vascular cause e.g. carotid dissection or equivalent, demyelinating disorder. The likelihood of each of these diagnoses has been appropriately considered in the context of this patient's presentation and my evaluation. PLAN: as described in EMR, including diagnostics, therapeutics and consultation as clinically warranted. I will continue to reevaluate the patient, including the results of all testing, and monitor response to therapy throughout the patient's course in the ED. Loss of balance within 24 hours w/ inability to ambulate w/ steady gait. Has significant pmhx will call code stroke and neuro eval  **ATTENDING MEDICAL DECISION MAKING/SYNTHESIS (Dr. Bradley Singh): I have reviewed the Chief Concern(s), the HPI, the ROS, and have directly performed and confirmed the findings on the Physical Examination. I have reviewed the medical decision making with all providers, as applicable. The PROBLEM REPRESENTATION at this time is: 60-year-old woman with history of Hypertension, hyperlipidemia, Diabetes Mellitus, old right eye ptosis and ?diminished vision O.D. secondary to old surgery-associated ptosis secondary to retinal detachment, and old pontine cerebrovascular accident (as seen on CT) now presenting with unsteady gait and concern for inability to independently gait within the past 24 hours. The MOST LIKELY DIAGNOSIS, and the LIST OF DIFFERENTIAL DIAGNOSES, includes (but is not limited to) the following: cerebrovascular accident, transient ischemic attack, vertebrobasilar insufficiency or equivalent, mass/tumor, serious bacterial infection or sepsis/severe sepsis e.g. meningococcemia, meningitis, encephalitis, or equivalent, dehydration, electrolyte-metabolic-endocrine derangements, vascular cause e.g. carotid dissection or equivalent, demyelinating disorder. The likelihood of each of these diagnoses has been appropriately considered in the context of this patient's presentation and my evaluation. PLAN: as described in EMR, including diagnostics, therapeutics and consultation as clinically warranted. I will continue to reevaluate the patient, including the results of all testing, and monitor response to therapy throughout the patient's course in the ED. Loss of balance within 24 hours w/ inability to ambulate w/ steady gait. Has significant pmhx will call code stroke and neuro eval  **ATTENDING MEDICAL DECISION MAKING/SYNTHESIS (Dr. Bradley Singh): I have reviewed the Chief Concern(s), the HPI, the ROS, and have directly performed and confirmed the findings on the Physical Examination. I have reviewed the medical decision making with all providers, as applicable. The PROBLEM REPRESENTATION at this time is: 60-year-old woman with history of Hypertension, hyperlipidemia, Diabetes Mellitus, old right eye ptosis and diminished vision O.D. (blindness) secondary to old surgery-associated ptosis secondary to retinal detachment, and old pontine cerebrovascular accident (as seen on CT) now presenting with unsteady gait and concern for inability to independently gait within the past 24 hours. The MOST LIKELY DIAGNOSIS, and the LIST OF DIFFERENTIAL DIAGNOSES, includes (but is not limited to) the following: cerebrovascular accident, transient ischemic attack, vertebrobasilar insufficiency or equivalent, mass/tumor, serious bacterial infection or sepsis/severe sepsis e.g. meningococcemia, meningitis, encephalitis, or equivalent, dehydration, electrolyte-metabolic-endocrine derangements, vascular cause e.g. carotid dissection or equivalent, demyelinating disorder. The likelihood of each of these diagnoses has been appropriately considered in the context of this patient's presentation and my evaluation. PLAN: as described in EMR, including diagnostics, therapeutics and consultation as clinically warranted. I will continue to reevaluate the patient, including the results of all testing, and monitor response to therapy throughout the patient's course in the ED.

## 2021-01-21 NOTE — ED ADULT NURSE NOTE - OBJECTIVE STATEMENT
60 year old A&Ox3 female BIB EMS from Urgent Care for dizziness since 1600 yesterday. PMH HTN, HLD, MI, DM. Per pt she felt right sided weakness and loss of proprioception, went to  and was told she has "vertigo" and to come to ED. MEYERS x 4. +right sided facial droop. Pt unable to ambulate with notable right sided weakness and loss of balance. + right eye deviation but states this "could be" due to a cataract surgery. +blurry vision in right eye.  Code stroke called at 1302. Tonio HIGGINS at bedside. , passed dysphagia. Denies CP, SOB, n/v/d, fevers, chills, abdominal pain, urinary symptoms,  numbness, tingling in upper and lower extremities, HA, blurry vision. VSS updated on plan of care.

## 2021-01-21 NOTE — ED CDU PROVIDER DISPOSITION NOTE - ATTENDING CONTRIBUTION TO CARE
**CDU ATTENDING ADDENDUM (Dr. Bradley Singh): I attest that I have directly examined this patient and reviewed and formulated the diagnostic and therapeutic management plan in collaboration with the advanced practitioner (NP, PA). Please see MDM note and remainder of EMR for findings from CC, HPI, ROS, and PE. (Rasheed)

## 2021-01-21 NOTE — ED PROVIDER NOTE - ABNORMAL RHYTHM
[facility] ambulatory encounter  URGENT CARE OFFICE VISIT    SUBJECTIVE:  Olga Rojas is a 37 year old female who presented requesting evaluation for low-grade fever for the last 2 days. This has not been accompanied by cough. There is no sinus pressure nor any postnasal drip. No complaints of ear pain. No rashes. No swollen joints. No urinary symptoms. Patient has baseline constipation with the last bowel movement being 2 days ago. This is normal pattern for the patient per the mother. Note that the patient has M are the mother reports she just wants her checked out because of the low-grade fever. It was accompanied by some mild dry heaving. No diarrhea. No actual vomiting. Mother switched her to a bland diet and this has helped considerably. No more dry heaving. Patient's energy level seems back to normal. Still has a low-grade fever but it is even lower than the 100.2 which the mother got at home.    Review of systems:    A review of systems was performed and findings relevant to these symptoms are included in the HPI.     PAST HISTORIES:    ALLERGIES:   Allergen Reactions   • Dru-Synephrine 12 Hour Spray [Nasal Spray] Other (See Comments)     Pulmonary edema       Current Outpatient Prescriptions   Medication Sig Dispense Refill   • levothyroxine (SYNTHROID) 88 MCG tablet Take 1 tablet by mouth daily. 30 tablet 11   • hydrocortisone (CORTIZONE) 2.5 % cream Apply a thin layer to scaly rash in eyebrows twice daily as needed 30 g 1   • fluocinonide (LIDEX) 0.05 % external solution Apply a thin layer (few drops) to rash in scalp once daily after shower as needed 60 mL 0     No current facility-administered medications for this visit.        Past Medical History:   Diagnosis Date   • Hypothyroidism due to acquired atrophy of thyroid    • Mental retardation    • Other specified infantile cerebral palsy    • Seborrheic dermatitis      History reviewed. No pertinent surgical history.  Social History     Social  History   • Marital status: Single     Spouse name: N/A   • Number of children: N/A   • Years of education: N/A     Social History Main Topics   • Smoking status: Never Smoker   • Smokeless tobacco: Never Used   • Alcohol use No   • Drug use: No   • Sexual activity: No     Other Topics Concern   • None     Social History Narrative     History   Smoking Status   • Never Smoker   Smokeless Tobacco   • Never Used     History   Alcohol Use No     Family History   Problem Relation Age of Onset   • Sleep Apnea Mother    • High cholesterol Mother    • Arthritis Maternal Grandmother    • COPD Maternal Grandmother    • Lung Cancer Maternal Grandfather        OBJECTIVE:  Physical Exam:    Visit Vitals   • BP 96/64 (BP Location: [facility] [place], Patient Position: Sitting)   • Pulse 92   • Temp 99 °F (37.2 °C) (Tympanic)   • Wt 59.8 kg   • LMP 05/19/2017   • SpO2 96%   • BMI 24.11 kg/m2        CONSTITUTIONAL: Well-hydrated, well-nourished female who appears to be in no acute distress. Awake, alert and cooperative.  HEENT: TMs are clear bilaterally. External ears without deformities. Hearing is grossly normal. Nose without deformities. There is moist nasal mucosa. Throat is clear with moist mucous membranes.  No pain to palpation of the sinuses bilaterally  NECK: No lymphadenopathy  LUNGS: Clear to auscultation bilaterally. No wheezes, rales or rhonchi noted.   CARDIOVASCULAR: Regular rate and rhythm with normal S1 and S2. There are no murmurs auscultated.   ABDOMEN: Soft and non-tender. No masses. No liver or spleen enlargement. Bowel sounds normal.   MUSCULOSKELETAL: Full ROM of the upper and lower extremities.  Able to walk without difficulty and squat.  Good  strength bilaterally.  SKIN: Warm, dry, intact without rash or lesion.    DIAGNOSTICS:        URGENT CARE COURSE:        Assessment:   1. Low grade fever      Most likely viral in origin. Physical exam is negative for any significant physical findings. Continue with bland diet  area mother states that she will give patient a usual treatment for constipation which is prone juice tonight. If no improvement in fever or if GI symptoms worsen follow up in urgent care or with PCP      PLAN:  No orders of the defined types were placed in this encounter.          FOLLOW-UP:  prn     PATIENT INSTRUCTIONS:       The patient was advised to follow up with primary physician or to recheck with the urgent care clinic sooner if symptoms get worse or if new symptoms appear.    The patient and mother indicated understanding of the diagnosis and agreed with the plan of care.      **ATTENDING ADDENDUM (Dr. Bradley Singh): normal sinus rhythm

## 2021-01-21 NOTE — ED PROVIDER NOTE - EYES [+], MLM
**ATTENDING ADDENDUM (Dr. Bradley Singh): POSITIVE history of chronic right eye ptosis and retinal detachment **ATTENDING ADDENDUM (Dr. Bradley Singh): POSITIVE history of chronic right eye ptosis and retinal detachment (right eye blindness)

## 2021-01-21 NOTE — ED PROVIDER NOTE - CARDIOVASCULAR [+], MLM
**ATTENDING ADDENDUM (Dr. Bradley Singh): POSITIVE history of Hypertension, hyperlipidemia, and myocardial infarction.

## 2021-01-21 NOTE — ED PROVIDER NOTE - NS ED ROS FT
CONSTITUTIONAL: No fevers, no chills, no lightheadedness  EYES: no visual changes, no eye pain  EARS: no ear drainage, no ear pain, no change in hearing  NOSE: no nasal congestion  MOUTH/THROAT: no sore throat  CV: No chest pain, no palpitations  RESP: No SOB  GI: No n/v/d, no abd pain  : no dysuria, no hematuria, no flank pain  MSK: no back pain, no extremity pain  SKIN: no rashes  NEURO: no headache, no decreased sensation/paresthesias   PSYCHIATRIC: no known mental health issues. CONSTITUTIONAL: No fevers, no chills, no lightheadedness  EYES: no visual changes, no eye pain  EARS: no ear drainage, no ear pain, no change in hearing  NOSE: no nasal congestion  MOUTH/THROAT: no sore throat  CV: No chest pain, no palpitations  RESP: No SOB  GI: No n/v/d, no abd pain  : no dysuria, no hematuria, no flank pain  MSK: no back pain, no extremity pain  SKIN: no rashes  NEURO: no headache, no decreased sensation/paresthesias   PSYCHIATRIC: no known mental health issues.  **ATTENDING ADDENDUM (Dr. Bradley Singh): During my interview with the patient, I have personally obtained and/or have directly verified the elements in the past medical/surgical history and other histories as noted earlier in the EMR, in conjunction with the other members (EM resident/PA/NP) of the patient care team. I have also personally obtained and/or have directly verified/reviewed the review of systems as documented below, in conjunction with the other members (EM resident/PA/NP) of the patient care team.

## 2021-01-21 NOTE — ED CDU PROVIDER INITIAL DAY NOTE - EYE CORNEA, RIGHT
**ATTENDING ADDENDUM (Dr. Bradley Singh): unable to assess/evaluate/HAZY/CLOUDY/KERATITIS **CDU ATTENDING ADDENDUM (Dr. Bradley Singh): unable to assess/evaluate/HAZY/CLOUDY/KERATITIS **CDU ATTENDING ADDENDUM (Dr. Bradley Singh): clear/clear

## 2021-01-21 NOTE — ED PROVIDER NOTE - MUSCULOSKELETAL, MLM
Spine appears normal, range of motion is not limited, no muscle or joint tenderness **ATTENDING ADDENDUM (Dr. Bradley Singh): Symmetrically equal strength, 5/5, in the bilateral upper and lower extremities. NO cords, soft-tissue swelling, or calf tenderness in the bilateral lower extremities.

## 2021-01-21 NOTE — CONSULT NOTE ADULT - ASSESSMENT
Impression:    Imbalance in the setting of dizziness w/o focal deficit. Patient currently improved, but symptomatic.     Plan:  - CDU for observation.  - MRI Brain w/o r/o central ischemia.   - Symptomatic treatment    - Meclizine 25-50mg PO BID-TID (MDD 100mg)    - IVF    - Can consider low dose of long acting benzo if refractory. Please limit use.

## 2021-01-21 NOTE — ED CDU PROVIDER DISPOSITION NOTE - PATIENT PORTAL LINK FT
You can access the FollowMyHealth Patient Portal offered by [facility] by registering at the following website: [email or web address]. By joining [employer]’s FollowMyHealth portal, you will also be able to view your health information using other applications (apps) compatible with our system.

## 2021-01-21 NOTE — ED PROVIDER NOTE - RESPIRATORY, MLM
Breath sounds clear and equal bilaterally. **ATTENDING ADDENDUM (Dr. Bradley Singh): NO wheezing, rales, rhonchi, crackles, stridor, drooling, retractions, nasal flaring, or tripoding.

## 2021-01-21 NOTE — ED CDU PROVIDER INITIAL DAY NOTE - CRANIAL NERVE AND PUPILLARY EXAM
**ATTENDING ADDENDUM (Dr. Bradley Singh): left eye with good range of motion/central vision intact/gag reflex intact/tongue is midline **CDU ATTENDING ADDENDUM (Dr. Bradley Singh): left eye with good range of motion/central vision intact/gag reflex intact/tongue is midline

## 2021-01-21 NOTE — ED PROVIDER NOTE - CRANIAL NERVE AND PUPILLARY EXAM
**ATTENDING ADDENDUM (Dr. Bradley Singh): left eye with good range of motion/central vision intact/gag reflex intact/tongue is midline

## 2021-01-21 NOTE — ED ADULT NURSE REASSESSMENT NOTE - COMFORT CARE
walker provided for pt to assist with ambulation./ambulated to bathroom/darkened lights/meal provided/plan of care explained/po fluids offered/repositioned/warm blanket provided

## 2021-01-21 NOTE — ED PROVIDER NOTE - PMH
Heart attack  2015  HLD (hyperlipidemia)    HTN (hypertension)     Heart attack  2015  HLD (hyperlipidemia)    HTN (hypertension)    Type 2 diabetes mellitus

## 2021-01-21 NOTE — ED CDU PROVIDER INITIAL DAY NOTE - NS ED ROS FT
** CDU ATTENDING ADDENDUM (Dr. Bradley Singh): During my interview with the patient, I have personally obtained and/or have directly verified the elements in the past medical/surgical history and other histories as noted earlier in the EMR, in conjunction with the other members (EM resident/PA/NP) of the patient care team. I have also personally obtained and/or have directly verified/reviewed the review of systems as documented below, in conjunction with the other members (EM resident/PA/NP) of the patient care team.

## 2021-01-21 NOTE — ED PROVIDER NOTE - SKIN, MLM
Skin normal color for race, warm, dry and intact. No evidence of rash. **ATTENDING ADDENDUM (Dr. Bradley Singh): NO rashes, lesions, ulcers, vesicles, erythema, streaking, lymphangitic spread, crepitus, cellulitis, petechiae, purpurae, track marks or ecchymoses.

## 2021-01-21 NOTE — ED CDU PROVIDER INITIAL DAY NOTE - PHYSICAL EXAMINATION
Gen: AAO x 3, NAD  Skin: No rashes or lesions  HEENT: NC/AT, PERRLA, EOMI, MMM  Resp: unlabored CTAB  Cardiac: rrr s1s2, no murmurs, rubs or gallops  GI: ND, +BS, Soft, NT  Ext: no pedal edema, FROM in all extremities  Neuro: no focal deficits, strength 5/5 BUE and BLE, sensation intact, normal gait, normal finger to nose Gen: AAO x 3, NAD  Skin: No rashes or lesions  HEENT: NC/AT, PERRLA, EOMI, MMM  Resp: unlabored CTAB  Cardiac: rrr s1s2, no murmurs, rubs or gallops  GI: ND, +BS, Soft, NT  Ext: no pedal edema, FROM in all extremities  Neuro: no focal deficits, strength 5/5 BUE and BLE, sensation intact, normal gait, normal finger to nose  **CDU ATTENDING ADDENDUM (Dr. Bradley Singh): I have reviewed and substantially contributed to the elements of the PE as documented above. I have directly performed an examination of this patient in conjunction with the other members (EM resident/PA/NP) of the patient care team.

## 2021-01-21 NOTE — ED CDU PROVIDER INITIAL DAY NOTE - EYES [+], MLM
right eye ptosis and retinal detachment right eye ptosis and retinal detachment **CDU ATTENDING ADDENDUM (Dr. Bradley Singh): POSITIVE chronic right eye blindness.

## 2021-01-21 NOTE — ED PROVIDER NOTE - CROS ED NEURO POS
**ATTENDING ADDENDUM (Dr. Bradley Singh): POSITIVE history of pontine cerebrovascular accident in the past.

## 2021-01-21 NOTE — ED PROVIDER NOTE - CARE PLAN
Goal:	**ATTENDING ADDENDUM (Dr. Bradley Singh): Goals of care include resolution of emergent/urgent symptoms and concerns, and restoration to baseline level of homeostasis.   Principal Discharge DX:	Imbalance  Goal:	**ATTENDING ADDENDUM (Dr. Bradley Singh): Goals of care include resolution of emergent/urgent symptoms and concerns, and restoration to baseline level of homeostasis.   Principal Discharge DX:	Imbalance  Goal:	**ATTENDING ADDENDUM (Dr. Bradley Singh): Goals of care include resolution of emergent/urgent symptoms and concerns, and restoration to baseline level of homeostasis.  Secondary Diagnosis:	Unsteady gait

## 2021-01-21 NOTE — ED PROVIDER NOTE - OBJECTIVE STATEMENT
59yo F hx htn hld dm CAD s/p stent, R "retinal surgery" p/w insidious onset loss of balance (feels self falling to R) since 4pm yesterday, worsens w/and went to [facility] and sent to ED. Also notes that she has been having 61yo F hx htn hld dm CAD s/p stent, R "retinal surgery" p/w insidious onset loss of balance (feels self falling to R) since 4pm yesterday, worsens w/ movement, and went to [facility] and sent to ED. Also notes that she has been having productive cough recently. No fever,chills, cp, sob, abd pain, n/v/d. Thinks her R arm/leg may be weaker. 59yo F hx htn hld dm CAD s/p stent, R "retinal surgery" p/w insidious onset loss of balance (feels self falling to R) since 4pm yesterday, worsens w/ movement, and went to [facility] and sent to ED. Also notes that she has been having productive cough recently. No fever,chills, cp, sob, abd pain, n/v/d. Thinks her R arm/leg may be weaker.  **ATTENDING ADDENDUM (Dr. Bradley Singh): I attest that I have directly and personally interviewed and examined this patient and elicited a comparable history of present illness and review of systems as documented, along with my EM resident. I attest that I have made significant contributions to the documentation where necessary and as noted in the EMR.

## 2021-01-21 NOTE — ED PROVIDER NOTE - GASTROINTESTINAL, MLM
Abdomen soft, non-tender **ATTENDING ADDENDUM (Dr. Bradley Singh): non-distended. NO guarding, rebound, or rigidity. NO pulsatile or non-pulsatile masses. NO hernias. NO obvious hepatosplenomegaly.

## 2021-01-22 VITALS
TEMPERATURE: 98 F | DIASTOLIC BLOOD PRESSURE: 78 MMHG | HEART RATE: 98 BPM | OXYGEN SATURATION: 98 % | SYSTOLIC BLOOD PRESSURE: 118 MMHG | RESPIRATION RATE: 18 BRPM

## 2021-01-22 LAB
A1C WITH ESTIMATED AVERAGE GLUCOSE RESULT: 11.8 % — HIGH (ref 4–5.6)
CHOLEST SERPL-MCNC: 148 MG/DL — SIGNIFICANT CHANGE UP
ESTIMATED AVERAGE GLUCOSE: 292 MG/DL — HIGH (ref 68–114)
GLUCOSE BLDC GLUCOMTR-MCNC: 206 MG/DL — HIGH (ref 70–99)
GLUCOSE BLDC GLUCOMTR-MCNC: 219 MG/DL — HIGH (ref 70–99)
HDLC SERPL-MCNC: 42 MG/DL — LOW
LIPID PNL WITH DIRECT LDL SERPL: 83 MG/DL — SIGNIFICANT CHANGE UP
NON HDL CHOLESTEROL: 107 MG/DL — SIGNIFICANT CHANGE UP
TRIGL SERPL-MCNC: 121 MG/DL — SIGNIFICANT CHANGE UP

## 2021-01-22 PROCEDURE — 84484 ASSAY OF TROPONIN QUANT: CPT

## 2021-01-22 PROCEDURE — U0005: CPT

## 2021-01-22 PROCEDURE — 99285 EMERGENCY DEPT VISIT HI MDM: CPT | Mod: 25

## 2021-01-22 PROCEDURE — 70498 CT ANGIOGRAPHY NECK: CPT

## 2021-01-22 PROCEDURE — 82962 GLUCOSE BLOOD TEST: CPT

## 2021-01-22 PROCEDURE — 80061 LIPID PANEL: CPT

## 2021-01-22 PROCEDURE — G1004: CPT

## 2021-01-22 PROCEDURE — 70551 MRI BRAIN STEM W/O DYE: CPT | Mod: 26,MG

## 2021-01-22 PROCEDURE — 85025 COMPLETE CBC W/AUTO DIFF WBC: CPT

## 2021-01-22 PROCEDURE — 70450 CT HEAD/BRAIN W/O DYE: CPT

## 2021-01-22 PROCEDURE — 70496 CT ANGIOGRAPHY HEAD: CPT

## 2021-01-22 PROCEDURE — 84702 CHORIONIC GONADOTROPIN TEST: CPT

## 2021-01-22 PROCEDURE — 71045 X-RAY EXAM CHEST 1 VIEW: CPT

## 2021-01-22 PROCEDURE — 80053 COMPREHEN METABOLIC PANEL: CPT

## 2021-01-22 PROCEDURE — 83036 HEMOGLOBIN GLYCOSYLATED A1C: CPT

## 2021-01-22 PROCEDURE — 70551 MRI BRAIN STEM W/O DYE: CPT

## 2021-01-22 PROCEDURE — 85730 THROMBOPLASTIN TIME PARTIAL: CPT

## 2021-01-22 PROCEDURE — 82803 BLOOD GASES ANY COMBINATION: CPT

## 2021-01-22 PROCEDURE — 93005 ELECTROCARDIOGRAM TRACING: CPT

## 2021-01-22 PROCEDURE — U0003: CPT

## 2021-01-22 PROCEDURE — 85610 PROTHROMBIN TIME: CPT

## 2021-01-22 PROCEDURE — 97161 PT EVAL LOW COMPLEX 20 MIN: CPT

## 2021-01-22 RX ORDER — CLOPIDOGREL BISULFATE 75 MG/1
1 TABLET, FILM COATED ORAL
Qty: 0 | Refills: 0 | DISCHARGE

## 2021-01-22 RX ORDER — RAMIPRIL 5 MG
1 CAPSULE ORAL
Qty: 0 | Refills: 0 | DISCHARGE

## 2021-01-22 RX ORDER — METFORMIN HYDROCHLORIDE 850 MG/1
1 TABLET ORAL
Qty: 0 | Refills: 0 | DISCHARGE

## 2021-01-22 RX ORDER — ASPIRIN/CALCIUM CARB/MAGNESIUM 324 MG
0 TABLET ORAL
Qty: 0 | Refills: 0 | DISCHARGE

## 2021-01-22 RX ORDER — AMLODIPINE BESYLATE 2.5 MG/1
1 TABLET ORAL
Qty: 0 | Refills: 0 | DISCHARGE

## 2021-01-22 RX ORDER — ATORVASTATIN CALCIUM 80 MG/1
1 TABLET, FILM COATED ORAL
Qty: 0 | Refills: 0 | DISCHARGE

## 2021-01-22 RX ADMIN — Medication 81 MILLIGRAM(S): at 09:14

## 2021-01-22 RX ADMIN — CLOPIDOGREL BISULFATE 75 MILLIGRAM(S): 75 TABLET, FILM COATED ORAL at 09:15

## 2021-01-22 RX ADMIN — AMLODIPINE BESYLATE 5 MILLIGRAM(S): 2.5 TABLET ORAL at 09:14

## 2021-01-22 RX ADMIN — LISINOPRIL 40 MILLIGRAM(S): 2.5 TABLET ORAL at 09:16

## 2021-01-22 RX ADMIN — Medication 2: at 09:13

## 2021-01-22 RX ADMIN — Medication 2: at 11:56

## 2021-01-22 RX ADMIN — SODIUM CHLORIDE 3 MILLILITER(S): 9 INJECTION INTRAMUSCULAR; INTRAVENOUS; SUBCUTANEOUS at 05:24

## 2021-01-22 NOTE — ED CDU PROVIDER SUBSEQUENT DAY NOTE - EYE, RIGHT
**CDU ATTENDING ADDENDUM (Dr. Bradley Singh): chronic scarring, ?blindness **CDU ATTENDING ADDENDUM (Dr. Bradley Singh): chronic right eye blindness

## 2021-01-22 NOTE — ED ADULT NURSE REASSESSMENT NOTE - NIH STROKE SCALE: 11. EXTINCTION AND INATTENTION, QM
(1) Visual, tactile, auditory, spatial, or personal inattention or extinction to bilateral simultaneous stimulation in one of the sensory modalities
(0) No abnormality
(1) Visual, tactile, auditory, spatial, or personal inattention or extinction to bilateral simultaneous stimulation in one of the sensory modalities

## 2021-01-22 NOTE — ED CDU PROVIDER SUBSEQUENT DAY NOTE - RESPIRATORY, MLM
Breath sounds clear and equal bilaterally. Breath sounds clear and equal bilaterally. **CDU ATTENDING ADDENDUM (Dr. Bradley Singh): NO wheezing, rales, rhonchi, crackles, stridor, drooling, retractions, nasal flaring, or tripoding.

## 2021-01-22 NOTE — ED CDU PROVIDER SUBSEQUENT DAY NOTE - CRANIAL NERVE AND PUPILLARY EXAM
**CDU ATTENDING ADDENDUM (Dr. Bradley Singh): left eye with good range of motion/central vision intact/gag reflex intact/tongue is midline

## 2021-01-22 NOTE — ED CDU PROVIDER SUBSEQUENT DAY NOTE - MUSCULOSKELETAL, MLM
164 Spine appears normal, range of motion is not limited, no muscle or joint tenderness Spine appears normal, range of motion is not limited, no muscle or joint tenderness **CDU ATTENDING ADDENDUM (Dr. Bradley Singh): Symmetrically equal strength, 5/5, in the bilateral upper and lower extremities. NO cords, soft-tissue swelling, or calf tenderness in the bilateral lower extremities.

## 2021-01-22 NOTE — ED CDU PROVIDER SUBSEQUENT DAY NOTE - LAB INTERPRETATION
**CDU ATTENDING ADDENDUM (Dr. Bradley Singh): Labs reviewed and analyzed. Pertinent findings include: NO profound or severe electrolyte-metabolic-endocrine derangements, anemia, or leukocytosis.

## 2021-01-22 NOTE — ED ADULT NURSE REASSESSMENT NOTE - NS ED NURSE REASSESS COMMENT FT1
BP at this time 171/97. Pt denies headache or any vision changes. MD Elizalde aware.
Handoff received from CHRISTELLE Tao. Pt BIB EMS from  c/o right sided weakness and feeling unsteady on her feet. Code Stroke called at 13:02.
report taken from Suly BOURGEOIS. states pt had good night with no complaints. Will continue to monitor.
Received pt from CHRISTELLE Christie , received pt alert and responsive, oriented x4, denies any respiratory distress, SOB, or difficulty breathing. Pt transferred to CDU for unsteady gait. Pt states she leans more to the left when she ambulates. Otherwise no new neuro deficits noted. Pending MRI in AM pt aware. On telemetry pt is SR hr: 80's. IV in place, patent and free of signs of infiltration, pt denies chest pain or palpitations, V/S stable, pt afebrile, pt denies pain at this time. Pt educated on unit and unit rules, instructed patient to notify RN of any needed assistance, Pt verbalizes understanding, Call bell placed within reach. Safety maintained. Will continue to monitor.

## 2021-01-22 NOTE — ED CDU PROVIDER SUBSEQUENT DAY NOTE - NS ED ROS FT
Constitutional: No fever or chills  Eyes: No new visual changes  CV: No chest pain or lower extremity edema  Resp: No SOB no cough  GI: No abd pain. No nausea or vomiting. No diarrhea.   : No dysuria, hematuria.   MSK: No musculoskeletal pain  Skin: No rash  Psych: No complaints   Neuro: +dizziness +unstable gait No headache. No numbness or tingling. Constitutional: No fever or chills  Eyes: No new visual changes  CV: No chest pain or lower extremity edema  Resp: No SOB no cough  GI: No abd pain. No nausea or vomiting. No diarrhea.   : No dysuria, hematuria.   MSK: No musculoskeletal pain  Skin: No rash  Psych: No complaints   Neuro: +dizziness +unstable gait No headache. No numbness or tingling.  **CDU ATTENDING ADDENDUM (Dr. Bradley Singh): During my interview with the patient, I have personally obtained and/or have directly verified the elements in the past medical/surgical history and other histories as noted earlier in the EMR, in conjunction with the other members (EM resident/PA/NP) of the patient care team. I have also personally obtained and/or have directly verified/reviewed the review of systems as documented below, in conjunction with the other members (EM resident/PA/NP) of the patient care team.

## 2021-01-22 NOTE — ED CDU PROVIDER SUBSEQUENT DAY NOTE - EYE ANTERIOR CHAMBER, RIGHT
**CDU ATTENDING ADDENDUM (Dr. Bradley Singh): unable to assess/evaluate **CDU ATTENDING ADDENDUM (Dr. Bradley Singh): NO hyphema or hypopion./normal

## 2021-01-22 NOTE — ED CDU PROVIDER SUBSEQUENT DAY NOTE - SKIN, MLM
Skin normal color for race, warm, dry and intact. No evidence of rash. **CDU ATTENDING ADDENDUM (Dr. Bradley Singh): NO rashes, lesions, ulcers, vesicles, erythema, streaking, lymphangitic spread, crepitus, cellulitis, petechiae, purpurae, track marks or ecchymoses.

## 2021-01-22 NOTE — ED CDU PROVIDER SUBSEQUENT DAY NOTE - PHYSICAL EXAMINATION
Gen: AAO x 3, NAD  	PSYCH: Normal affect and appropriate mood.  	HEENT: NC/AT, R eye deviated post surgical and able to move. L eye EOMI. L pupil responsive to light   	Resp: unlabored CTAB  	Cardiac: rrr s1s2, no murmurs, rubs or gallops  	GI: ND, +BS, Soft, NT  	Ext: no pedal edema, FROM in all extremities  Neuro: no focal deficits, strength 5/5 BUE and BLE, sensation intact, steady gait with walker, no pronator drift Gen: AAO x 3, NAD  	PSYCH: Normal affect and appropriate mood.  	HEENT: NC/AT, R eye deviated post surgical and able to move. L eye EOMI. L pupil responsive to light   	Resp: unlabored CTAB  	Cardiac: rrr s1s2, no murmurs, rubs or gallops  	GI: ND, +BS, Soft, NT  	Ext: no pedal edema, FROM in all extremities  Neuro: no focal deficits, strength 5/5 B/l UE and B/l LE, sensation intact, steady gait with walker, no pronator drift Gen: AAO x 3, NAD  	PSYCH: Normal affect and appropriate mood.  	HEENT: NC/AT, R eye deviated post surgical and able to move. L eye EOMI. L pupil responsive to light   	Resp: unlabored CTAB  	Cardiac: rrr s1s2, no murmurs, rubs or gallops  	GI: ND, +BS, Soft, NT  	Ext: no pedal edema, FROM in all extremities  Neuro: no focal deficits, strength 5/5 B/l UE and B/l LE, sensation intact, steady gait with walker, no pronator drift  **CDU ATTENDING ADDENDUM (Dr. Bradley Singh): I have reviewed and substantially contributed to the elements of the PE as documented above. I have directly performed an examination of this patient in conjunction with the other members (EM resident/PA/NP) of the patient care team. I have reviewed the vital signs as part of my physical examination.

## 2021-01-22 NOTE — ED CDU PROVIDER SUBSEQUENT DAY NOTE - MEDICAL DECISION MAKING DETAILS
**ATTENDING MEDICAL DECISION MAKING/SYNTHESIS (Dr. Bradley Singh): I have reviewed the Chief Concern(s), the HPI, the ROS, and have directly performed and confirmed the findings on the Physical Examination. I have reviewed the medical decision making with all providers, as applicable. The PROBLEM REPRESENTATION at this time is: 60-year-old woman with history of Hypertension, hyperlipidemia, old right eye ptosis in association with history of retinal detachment, Diabetes Mellitus, and old pontine cerebrovascular accident (as seen on CT) now presenting with unsteady gait and concern for inability to independently gait within 24 hours of ED presentation. Of note, and in addition to the above, there were NO acute events overnight, except requirement for anticipatory guidance regarding MRI and communication with patient to be patient and remain in CDU for completion of requested workup. The MOST LIKELY DIAGNOSIS, and the LIST OF DIFFERENTIAL DIAGNOSES, includes (but is not limited to) the following: cerebrovascular accident, transient ischemic attack, vertebrobasilar insufficiency or equivalent, mass/tumor, serious bacterial infection or sepsis/severe sepsis e.g. meningococcemia, meningitis, encephalitis, or equivalent, dehydration, electrolyte-metabolic-endocrine derangements, vascular cause e.g. carotid dissection or equivalent, demyelinating disorder. The likelihood of each of these diagnoses has been appropriately considered in the context of this patient's presentation and my evaluation. PLAN: as described in EMR, including diagnostics, therapeutics and consultation as clinically warranted. I will continue to reevaluate the patient, including the results of all testing, and monitor response to therapy throughout the patient's course in the ED. **ATTENDING MEDICAL DECISION MAKING/SYNTHESIS (Dr. Bradley Singh): I have reviewed the Chief Concern(s), the HPI, the ROS, and have directly performed and confirmed the findings on the Physical Examination. I have reviewed the medical decision making with all providers, as applicable. The PROBLEM REPRESENTATION at this time is: 60-year-old woman with history of Hypertension, hyperlipidemia, old right eye ptosis in association with history of retinal detachment, Diabetes Mellitus, and old pontine cerebrovascular accident (as seen on CT) now presenting with unsteady gait and concern for inability to independently gait within 24 hours of ED presentation. Of note, and in addition to the above, there were NO acute events overnight, except requirement for anticipatory guidance regarding MRI and communication with patient to be patient and remain in CDU for completion of requested workup. The MOST LIKELY DIAGNOSIS, and the LIST OF DIFFERENTIAL DIAGNOSES, includes (but is not limited to) the following: cerebrovascular accident, transient ischemic attack, vertebrobasilar insufficiency or equivalent, mass/tumor, serious bacterial infection or sepsis/severe sepsis e.g. meningococcemia, meningitis, encephalitis, or equivalent, dehydration, electrolyte-metabolic-endocrine derangements, vascular cause e.g. carotid dissection or equivalent, demyelinating disorder. The likelihood of each of these diagnoses has been appropriately considered in the context of this patient's presentation and my evaluation. PLAN: as described in EMR, including diagnostics, therapeutics and consultation as clinically warranted. I will continue to reevaluate the patient, including the results of all testing, and monitor response to therapy throughout the patient's course in the CDU. **ATTENDING MEDICAL DECISION MAKING/SYNTHESIS (Dr. Bradley Singh): I have reviewed the Chief Concern(s), the HPI, the ROS, and have directly performed and confirmed the findings on the Physical Examination. I have reviewed the medical decision making with all providers, as applicable. The PROBLEM REPRESENTATION at this time is: 60-year-old woman with history of Hypertension, hyperlipidemia, old right eye ptosis and right eye blindness in association with history of retinal detachment, Diabetes Mellitus, and old pontine cerebrovascular accident (as seen on CT) now presenting with unsteady gait and concern for inability to independently gait within 24 hours of ED presentation. Of note, and in addition to the above, there were NO acute events overnight, except requirement for anticipatory guidance regarding MRI and communication with patient to be patient and remain in CDU for completion of requested workup. The MOST LIKELY DIAGNOSIS, and the LIST OF DIFFERENTIAL DIAGNOSES, includes (but is not limited to) the following: cerebrovascular accident, transient ischemic attack, vertebrobasilar insufficiency or equivalent, mass/tumor, serious bacterial infection or sepsis/severe sepsis e.g. meningococcemia, meningitis, encephalitis, or equivalent, dehydration, electrolyte-metabolic-endocrine derangements, vascular cause e.g. carotid dissection or equivalent, demyelinating disorder. The likelihood of each of these diagnoses has been appropriately considered in the context of this patient's presentation and my evaluation. PLAN: as described in EMR, including diagnostics, therapeutics and consultation as clinically warranted. I will continue to reevaluate the patient, including the results of all testing, and monitor response to therapy throughout the patient's course in the CDU.

## 2021-01-22 NOTE — ED CDU PROVIDER SUBSEQUENT DAY NOTE - ATTENDING CONTRIBUTION TO CARE
**ATTENDING ADDENDUM (Dr. Bradley Singh): I attest that I have directly examined this patient and reviewed and formulated the diagnostic and therapeutic management plan in collaboration with the advanced practitioner (NP, PA). Please see MDM note and remainder of EMR for findings from CC, HPI, ROS, and PE. (Rasheed) **ATTENDING ADDENDUM (Dr. Bradley Singh): I attest that I have directly examined this patient and reviewed and formulated the diagnostic and therapeutic management plan in collaboration with the advanced practitioner (NP, PA). Please see MDM note and remainder of EMR for findings from CC, HPI, ROS, and PE. (Rasheed/Beatrice)

## 2021-01-22 NOTE — ED CDU PROVIDER SUBSEQUENT DAY NOTE - EYES [+], MLM
**CDU ATTENDING ADDENDUM (Dr. Bradley Singh): POSITIVE history of chronic right eye ptosis and retinal detachment **CDU ATTENDING ADDENDUM (Dr. Bradley Singh): POSITIVE history of chronic right eye ptosis and retinal detachment (blindness)/VISUAL CHANGES

## 2021-01-22 NOTE — ED CDU PROVIDER SUBSEQUENT DAY NOTE - CONSTITUTIONAL MENTATION
awake/alert/oriented to person, place, time/situation **CDU ATTENDING ADDENDUM (Dr. Bradley Singh): awake, alert, coherent, interactive, and appropriate./awake/alert/oriented to person, place, time/situation

## 2021-01-22 NOTE — ED CDU PROVIDER SUBSEQUENT DAY NOTE - GASTROINTESTINAL, MLM
Abdomen soft, non-tender, no rebound, no guarding. Abdomen soft, non-tender **CDU ATTENDING ADDENDUM (Dr. Bradley Singh): NO guarding, rebound, or rigidity. NO pulsatile or non-pulsatile masses. NO hernias. NO obvious hepatosplenomegaly.

## 2021-01-22 NOTE — ED ADULT NURSE REASSESSMENT NOTE - NEURO GAIT
uses walker./unsteady/requires assistance
Walker provided./unsteady/requires assistance
walker provided, pt leans toward L side./unsteady

## 2021-01-22 NOTE — ED ADULT NURSE REASSESSMENT NOTE - NIH STROKE SCALE: 2. BEST GAZE, QM
Onset:  Over the weekend    Location / description:  patient said he was playing soccer with friends as he went to kick the ball left foot came in contact with another person , patient did experience pain at the time, did try to continue playing was not able to.    Precipitating Factors: N/A    Pain Scale (1 - 10), 10 highest:  Current pain level when standing or walking 4-5/10    Associated Symptoms: Patient verbalized he does have swelling to the top and side of the foot . Limps when walking. Is able to walk and stand with some discomfort.    What  improves / worsens symptoms: patient has been apply ice pack and elevating the foot.  No redness or bruising, or breaks in the skin.    Covid 19 screen:   Patient denies any fever, cough, sore throat, headache, shortness of breath, difficulty taking in a breath, no loss of taste of smell, no nausea, vomiting, diarrhea, no  contact with any person known to have covid 19.    Symptom specific medications:  Not taking any medication at this time.    Recent Care: last office visit with Dr Bowser 12/05/19     Patient aware Dr Bowser is ut of the office until 8/18/20     This nurse advised patient to continue to monitor foot for any worsening symptoms, elevate when possible, continue to apply ice pack for 20 minutes on and 1 hour off for the swelling.  Is symptoms continue or worsen, increase pain or difficulty walking then he should be seen in the [facility] for evaluation.   Patient verbalized understanding of information given.    Forward to Dr Ga please be aware in Dr Bowser absence    
(0) Normal

## 2021-01-22 NOTE — ED CDU PROVIDER SUBSEQUENT DAY NOTE - EYE VISUAL FIELD, RIGHT
**CDU ATTENDING ADDENDUM (Dr. Bradley Singh): unable to assess/evaluate/diminished **CDU ATTENDING ADDENDUM (Dr. Bradley Singh): chronic APD secondary to known blindness./diminished

## 2021-01-22 NOTE — ED CDU PROVIDER SUBSEQUENT DAY NOTE - HISTORY
No interval changes since initial CDU provider note. Pt feels well without complaint. NAD VSS. no events on tele. Neuro exam remains unchanged from prior exam. Plan for MRI in the setting of unsteady gait. - TOSHA Iqbal

## 2021-01-22 NOTE — ED CDU PROVIDER SUBSEQUENT DAY NOTE - EYE RETINA, RIGHT
**ATTENDING ADDENDUM (Dr. 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

## 2021-01-22 NOTE — ED CDU PROVIDER SUBSEQUENT DAY NOTE - PROGRESS NOTE DETAILS
Of note, per patient, states that she has been "blind" in R eye since retina surgery. States that she is able to see light in here R eye only. - Cielo Iqbal PA-C Patient seen at bedside. VSS. No acute events on telemetry. Patient resting comfortably, no complaints. Neuro check: A+Ox3, R eye deviated post surgical and able to move. L eye EOMI. L pupil responsive to light. Strength 5/5 B/l UE and B/l LE, sensation intact. Will reevaluate. - Cielo Iqbal PA-C Patient seen at bedside in NAD.  VSS.  Patient resting comfortably without complaints. Patient reports that she is feeling better.  Ambulating well without assistance. Awaiting MRI.  -Everardo Barron PA-C **CDU ATTENDING ADDENDUM (Dr. Bradley Singh): patient serially evaluated throughout CDU course by CDU team. NO acute deterioration up to this time in the CDU. Agree with above notation by MATT Barron. Awaiting completion of all diagnostics as ordered (MRI) and reassessments. Will continue to observe and monitor closely. Will personally evaluate at beginning of CDU clinical shift. MRI negative for acute pathology.  Neuro reevaluated the patient this AM and is recommending DC home with outpatient neuro follow up.  Strict return and follow up instructions provided. -Everardo Barron PA-C MRI negative for acute pathology.  Neuro reevaluated the patient this AM and is recommending DC home with outpatient neuro follow up.  Strict return and follow up instructions provided. -Everardo Barron PA-C  **CDU ATTENDING ADDENDUM (Dr. Bradley Singh): patient serially evaluated throughout CDU course by CDU team. NO acute deterioration up to this time in the CDU. Reevaluated patient personally. Had been resting comfortably earlier, now eating. NO acute complaints overnight; physical examination unchanged. Reviewed findings of MRI with patient. Extensive anticipatory guidance provided to patient +/or family member(s). Agree with discharge home with close outpatient followup with her primary care physician/provider and neurologist. Patient appears STABLE for discharge at this time.

## 2021-01-22 NOTE — ED CDU PROVIDER SUBSEQUENT DAY NOTE - CROS ED NEURO POS
**CDU ATTENDING ADDENDUM (Dr. Bradley Singh): POSITIVE history of pontine cerebrovascular accident in the past./DIFFICULTY WALKING / IMBALANCE

## 2021-05-06 NOTE — ED CDU PROVIDER DISPOSITION NOTE - CARE PROVIDER_API CALL
Thomas Joe)  Neurology; Vascular Neurology  [address] [facility] [address], [address]  [place] [address]  Phone: [phone]  Fax: [phone]  Follow Up Time:    0 = understands/communicates without difficulty

## 2021-08-09 NOTE — ED ADULT NURSE NOTE - NS ED NOTE ABUSE RESPONSE YN
Medicare Preventive Visit Patient Instructions  Thank you for completing your Welcome to Medicare Visit or Medicare Annual Wellness Visit today  Your next wellness visit will be due in one year (8/10/2022)  The screening/preventive services that you may require over the next 5-10 years are detailed below  Some tests may not apply to you based off risk factors and/or age  Screening tests ordered at today's visit but not completed yet may show as past due  Also, please note that scanned in results may not display below  Preventive Screenings:  Service Recommendations Previous Testing/Comments   Colorectal Cancer Screening  · Colonoscopy    · Fecal Occult Blood Test (FOBT)/Fecal Immunochemical Test (FIT)  · Fecal DNA/Cologuard Test  · Flexible Sigmoidoscopy Age: 54-65 years old   Colonoscopy: every 10 years (May be performed more frequently if at higher risk)  OR  FOBT/FIT: every 1 year  OR  Cologuard: every 3 years  OR  Sigmoidoscopy: every 5 years  Screening may be recommended earlier than age 48 if at higher risk for colorectal cancer  Also, an individualized decision between you and your healthcare provider will decide whether screening between the ages of 74-80 would be appropriate   Colonoscopy: Not on file  FOBT/FIT: Not on file  Cologuard: Not on file  Sigmoidoscopy: Not on file          Prostate Cancer Screening Individualized decision between patient and health care provider in men between ages of 53-78   Medicare will cover every 12 months beginning on the day after your 50th birthday PSA: <0 1 ng/mL     History Prostate Cancer     Hepatitis C Screening Once for adults born between 1945 and 1965  More frequently in patients at high risk for Hepatitis C Hep C Antibody: 06/10/2020    Screening Current   Diabetes Screening 1-2 times per year if you're at risk for diabetes or have pre-diabetes Fasting glucose: 91 mg/dL   A1C: 6 3 %    Screening Current   Cholesterol Screening Once every 5 years if you don't have a lipid disorder  May order more often based on risk factors  Lipid panel: 11/05/2020    Screening Not Indicated  History Lipid Disorder      Other Preventive Screenings Covered by Medicare:  1  Abdominal Aortic Aneurysm (AAA) Screening: covered once if your at risk  You're considered to be at risk if you have a family history of AAA or a male between the age of 73-68 who smoking at least 100 cigarettes in your lifetime  2  Lung Cancer Screening: covers low dose CT scan once per year if you meet all of the following conditions: (1) Age 50-69; (2) No signs or symptoms of lung cancer; (3) Current smoker or have quit smoking within the last 15 years; (4) You have a tobacco smoking history of at least 30 pack years (packs per day x number of years you smoked); (5) You get a written order from a healthcare provider  3  Glaucoma Screening: covered annually if you're considered high risk: (1) You have diabetes OR (2) Family history of glaucoma OR (3)  aged 48 and older OR (3)  American aged 72 and older  3  Osteoporosis Screening: covered every 2 years if you meet one of the following conditions: (1) Have a vertebral abnormality; (2) On glucocorticoid therapy for more than 3 months; (3) Have primary hyperparathyroidism; (4) On osteoporosis medications and need to assess response to drug therapy  5  HIV Screening: covered annually if you're between the age of 12-76  Also covered annually if you are younger than 13 and older than 72 with risk factors for HIV infection  For pregnant patients, it is covered up to 3 times per pregnancy      Immunizations:  Immunization Recommendations   Influenza Vaccine Annual influenza vaccination during flu season is recommended for all persons aged >= 6 months who do not have contraindications   Pneumococcal Vaccine (Prevnar and Pneumovax)  * Prevnar = PCV13  * Pneumovax = PPSV23 Adults 25-60 years old: 1-3 doses may be recommended based on certain risk factors  Adults 72 years old: Prevnar (PCV13) vaccine recommended followed by Pneumovax (PPSV23) vaccine  If already received PPSV23 since turning 65, then PCV13 recommended at least one year after PPSV23 dose  Hepatitis B Vaccine 3 dose series if at intermediate or high risk (ex: diabetes, end stage renal disease, liver disease)   Tetanus (Td) Vaccine - COST NOT COVERED BY MEDICARE PART B Following completion of primary series, a booster dose should be given every 10 years to maintain immunity against tetanus  Td may also be given as tetanus wound prophylaxis  Tdap Vaccine - COST NOT COVERED BY MEDICARE PART B Recommended at least once for all adults  For pregnant patients, recommended with each pregnancy  Shingles Vaccine (Shingrix) - COST NOT COVERED BY MEDICARE PART B  2 shot series recommended in those aged 48 and above     Health Maintenance Due:      Topic Date Due    Colorectal Cancer Screening  08/25/2017    Hepatitis C Screening  Completed     Immunizations Due:      Topic Date Due    Pneumococcal Vaccine: 65+ Years (1 of 2 - PPSV23) 12/31/2020    Influenza Vaccine (1) 09/01/2021     Advance Directives   What are advance directives? Advance directives are legal documents that state your wishes and plans for medical care  These plans are made ahead of time in case you lose your ability to make decisions for yourself  Advance directives can apply to any medical decision, such as the treatments you want, and if you want to donate organs  What are the types of advance directives? There are many types of advance directives, and each state has rules about how to use them  You may choose a combination of any of the following:  · Living will: This is a written record of the treatment you want  You can also choose which treatments you do not want, which to limit, and which to stop at a certain time  This includes surgery, medicine, IV fluid, and tube feedings     · Durable power of  for [facility]): This is a written record that states who you want to make healthcare choices for you when you are unable to make them for yourself  This person, called a proxy, is usually a family member or a friend  You may choose more than 1 proxy  · Do not resuscitate (DNR) order:  A DNR order is used in case your heart stops beating or you stop breathing  It is a request not to have certain forms of treatment, such as CPR  A DNR order may be included in other types of advance directives  · Medical directive: This covers the care that you want if you are in a coma, near death, or unable to make decisions for yourself  You can list the treatments you want for each condition  Treatment may include pain medicine, surgery, blood transfusions, dialysis, IV or tube feedings, and a ventilator (breathing machine)  · Values history: This document has questions about your views, beliefs, and how you feel and think about life  This information can help others choose the care that you would choose  Why are advance directives important? An advance directive helps you control your care  Although spoken wishes may be used, it is better to have your wishes written down  Spoken wishes can be misunderstood, or not followed  Treatments may be given even if you do not want them  An advance directive may make it easier for your family to make difficult choices about your care  Cigarette Smoking and Your Health   Risks to your health if you smoke:  Nicotine and other chemicals found in tobacco damage every cell in your body  Even if you are a light smoker, you have an increased risk for cancer, heart disease, and lung disease  If you are pregnant or have diabetes, smoking increases your risk for complications  Benefits to your health if you stop smoking:   · You decrease respiratory symptoms such as coughing, wheezing, and shortness of breath     · You reduce your risk for cancers of the lung, mouth, throat, kidney, bladder, pancreas, stomach, and cervix  If you already have cancer, you increase the benefits of chemotherapy  You also reduce your risk for cancer returning or a second cancer from developing  · You reduce your risk for heart disease, blood clots, heart attack, and stroke  · You reduce your risk for lung infections, and diseases such as pneumonia, asthma, chronic bronchitis, and emphysema  · Your circulation improves  More oxygen can be delivered to your body  If you have diabetes, you lower your risk for complications, such as kidney, artery, and eye diseases  You also lower your risk for nerve damage  Nerve damage can lead to amputations, poor vision, and blindness  · You improve your body's ability to heal and to fight infections  For more information and support to stop smoking:   · [place]  Phone: 6- [phone]  Web Address: [employer]  Weight Management   Why it is important to manage your weight:  Being overweight increases your risk of health conditions such as heart disease, high blood pressure, type 2 diabetes, and certain types of cancer  It can also increase your risk for osteoarthritis, sleep apnea, and other respiratory problems  Aim for a slow, steady weight loss  Even a small amount of weight loss can lower your risk of health problems  How to lose weight safely:  A safe and healthy way to lose weight is to eat fewer calories and get regular exercise  You can lose up about 1 pound a week by decreasing the number of calories you eat by 500 calories each day  Healthy meal plan for weight management:  A healthy meal plan includes a variety of foods, contains fewer calories, and helps you stay healthy  A healthy meal plan includes the following:  · Eat whole-grain foods more often  A healthy meal plan should contain fiber  Fiber is the part of grains, fruits, and vegetables that is not broken down by your body   Whole-grain foods are healthy and provide extra fiber in your diet  Some examples of whole-grain foods are whole-wheat breads and pastas, oatmeal, brown rice, and bulgur  · Eat a variety of vegetables every day  Include dark, leafy greens such as spinach, kale, ratna greens, and mustard greens  Eat yellow and orange vegetables such as carrots, sweet potatoes, and winter squash  · Eat a variety of fruits every day  Choose fresh or canned fruit (canned in its own juice or light syrup) instead of juice  Fruit juice has very little or no fiber  · Eat low-fat dairy foods  Drink fat-free (skim) milk or 1% milk  Eat fat-free yogurt and low-fat cottage cheese  Try low-fat cheeses such as mozzarella and other reduced-fat cheeses  · Choose meat and other protein foods that are low in fat  Choose beans or other legumes such as split peas or lentils  Choose fish, skinless poultry (chicken or turkey), or lean cuts of red meat (beef or pork)  Before you cook meat or poultry, cut off any visible fat  · Use less fat and oil  Try baking foods instead of frying them  Add less fat, such as margarine, sour cream, regular salad dressing and mayonnaise to foods  Eat fewer high-fat foods  Some examples of high-fat foods include french fries, doughnuts, ice cream, and cakes  · Eat fewer sweets  Limit foods and drinks that are high in sugar  This includes candy, cookies, regular soda, and sweetened drinks  Exercise:  Exercise at least 30 minutes per day on most days of the week  Some examples of exercise include walking, biking, dancing, and swimming  You can also fit in more physical activity by taking the stairs instead of the elevator or parking farther away from stores  Ask your healthcare provider about the best exercise plan for you  © Copyright [employer] 2018 Information is for End User's use only and may not be sold, redistributed or otherwise used for commercial purposes   All illustrations and images included in CareNotes® are the copyrighted property of [employer]  or [address] [facility] Yes

## 2021-11-04 NOTE — PHYSICAL THERAPY INITIAL EVALUATION ADULT - PERTINENT HX OF CURRENT PROBLEM, REHAB EVAL
Clear Pt is a 61 y/o female admitted to [facility] on 1/21/21 hx htn hld dm CAD s/p stent, R "retinal surgery" p/w insidious onset loss of balance (feels self falling to R) since 4pm yesterday, worsens w/ movement, and went to [facility] and sent to ED. (-) CTA head, (-) MRI head

## 2021-11-29 NOTE — ED PROVIDER NOTE - TEMPLATE
Please review- INR 3.0 in goal of 2.5-3.5. Lovenox injections discontinued as INR in goal. Resume current dosing schedule. Recheck INR in 1 week. Neuro

## 2021-12-07 NOTE — ED CDU PROVIDER INITIAL DAY NOTE - NSCAREINITIATED _GEN_ER
Impression: Nonexudative macular degeneration, early dry stage, bilateral: H35.313. Plan: Patient educated regarding findings. AREDS formula is not indicated at this juncture. Recommend UV protection, Omega 3s PO and leafy green vegetables in diet as long as they not contraindicated. Continue to observe. RTC 1 year for DFE and possible MAC OCT or sooner with any sudden change in vision. Bradley Elizalde(Resident)

## 2023-09-20 NOTE — ED CDU PROVIDER SUBSEQUENT DAY NOTE - CARDIOVASCULAR [+], MLM
stable **CDU ATTENDING ADDENDUM (Dr. Bradley Singh): POSITIVE history of Hypertension, hyperlipidemia, and myocardial infarction.

## 2023-10-06 ENCOUNTER — INPATIENT (INPATIENT)
Facility: HOSPITAL | Age: 63
LOS: 3 days | Discharge: HOME | DRG: 502 | End: 2023-10-10
Attending: STUDENT IN AN ORGANIZED HEALTH CARE EDUCATION/TRAINING PROGRAM | Admitting: STUDENT IN AN ORGANIZED HEALTH CARE EDUCATION/TRAINING PROGRAM
Payer: COMMERCIAL

## 2023-10-06 VITALS
WEIGHT: 111.99 LBS | HEART RATE: 92 BPM | SYSTOLIC BLOOD PRESSURE: 135 MMHG | TEMPERATURE: 98 F | RESPIRATION RATE: 18 BRPM | DIASTOLIC BLOOD PRESSURE: 67 MMHG | OXYGEN SATURATION: 98 % | HEIGHT: 61 IN

## 2023-10-06 DIAGNOSIS — Z95.5 PRESENCE OF CORONARY ANGIOPLASTY IMPLANT AND GRAFT: Chronic | ICD-10-CM

## 2023-10-06 DIAGNOSIS — Z98.49 CATARACT EXTRACTION STATUS, UNSPECIFIED EYE: Chronic | ICD-10-CM

## 2023-10-06 PROCEDURE — 99285 EMERGENCY DEPT VISIT HI MDM: CPT

## 2023-10-06 RX ORDER — KETOROLAC TROMETHAMINE 30 MG/ML
15 SYRINGE (ML) INJECTION ONCE
Refills: 0 | Status: DISCONTINUED | OUTPATIENT
Start: 2023-10-06 | End: 2023-10-06

## 2023-10-06 RX ORDER — ACETAMINOPHEN 500 MG
650 TABLET ORAL ONCE
Refills: 0 | Status: COMPLETED | OUTPATIENT
Start: 2023-10-06 | End: 2023-10-06

## 2023-10-06 RX ADMIN — Medication 650 MILLIGRAM(S): at 23:53

## 2023-10-06 RX ADMIN — Medication 15 MILLIGRAM(S): at 23:56

## 2023-10-06 NOTE — ED ADULT TRIAGE NOTE - NS ED NURSE AMBULANCES
[facility]

## 2023-10-06 NOTE — ED ADULT NURSE NOTE - OBJECTIVE STATEMENT
s/p fall as per pt she missed a step fell down stairs and she was in so much pain she felt dizzy after she fell. c/o pain to left knee and right ankle, denies hitting her head or loc. Pt is on blood thinners

## 2023-10-06 NOTE — ED ADULT NURSE NOTE - NSFALLHARMRISKINTERV_ED_ALL_ED
Assistance OOB with selected safe patient handling equipment if applicable/Communicate risk of Fall with Harm to all staff, patient, and family/Monitor gait and stability/Provide patient with walking aids/Provide visual cue: red socks, yellow wristband, yellow gown, etc/Reinforce activity limits and safety measures with patient and family/Bed in lowest position, wheels locked, appropriate side rails in place/Call bell, personal items and telephone in reach/Instruct patient to call for assistance before getting out of bed/chair/stretcher/Non-slip footwear applied when patient is off stretcher/[place] to call system/Physically safe environment - no spills, clutter or unnecessary equipment/Purposeful Proactive Rounding/Room/bathroom lighting operational, light cord in reach Assistance OOB with selected safe patient handling equipment if applicable/Communicate risk of Fall with Harm to all staff, patient, and family/Monitor gait and stability/Provide patient with walking aids/Provide visual cue: red socks, yellow wristband, yellow gown, etc/Reinforce activity limits and safety measures with patient and family/Bed in lowest position, wheels locked, appropriate side rails in place/Call bell, personal items and telephone in reach/Instruct patient to call for assistance before getting out of bed/chair/stretcher/Non-slip footwear applied when patient is off stretcher/[place] to call system/Physically safe environment - no spills, clutter or unnecessary equipment/Purposeful Proactive Rounding/Room/bathroom lighting operational, light cord in reach Assistance OOB with selected safe patient handling equipment if applicable/Communicate risk of Fall with Harm to all staff, patient, and family/Monitor gait and stability/Provide patient with walking aids/Provide visual cue: red socks, yellow wristband, yellow gown, etc/Reinforce activity limits and safety measures with patient and family/Bed in lowest position, wheels locked, appropriate side rails in place/Call bell, personal items and telephone in reach/Instruct patient to call for assistance before getting out of bed/chair/stretcher/Non-slip footwear applied when patient is off stretcher/[place] to call system/Physically safe environment - no spills, clutter or unnecessary equipment/Purposeful Proactive Rounding/Room/bathroom lighting operational, light cord in reach

## 2023-10-06 NOTE — ED ADULT TRIAGE NOTE - CHIEF COMPLAINT QUOTE
Pt BIBA s/p fall as per pt she missed a step fell down stairs and she was in so much pain she felt dizzy after she fell. c/o pain to left knee and right ankle, denies hitting her head or loc. Pt is on blood thinners

## 2023-10-07 DIAGNOSIS — I21.9 ACUTE MYOCARDIAL INFARCTION, UNSPECIFIED: ICD-10-CM

## 2023-10-07 DIAGNOSIS — Z29.9 ENCOUNTER FOR PROPHYLACTIC MEASURES, UNSPECIFIED: ICD-10-CM

## 2023-10-07 DIAGNOSIS — S82.009A UNSPECIFIED FRACTURE OF UNSPECIFIED PATELLA, INITIAL ENCOUNTER FOR CLOSED FRACTURE: ICD-10-CM

## 2023-10-07 DIAGNOSIS — H33.21 SEROUS RETINAL DETACHMENT, RIGHT EYE: ICD-10-CM

## 2023-10-07 DIAGNOSIS — E04.1 NONTOXIC SINGLE THYROID NODULE: ICD-10-CM

## 2023-10-07 DIAGNOSIS — E11.9 TYPE 2 DIABETES MELLITUS WITHOUT COMPLICATIONS: ICD-10-CM

## 2023-10-07 DIAGNOSIS — I10 ESSENTIAL (PRIMARY) HYPERTENSION: ICD-10-CM

## 2023-10-07 DIAGNOSIS — Z95.5 PRESENCE OF CORONARY ANGIOPLASTY IMPLANT AND GRAFT: Chronic | ICD-10-CM

## 2023-10-07 DIAGNOSIS — E78.5 HYPERLIPIDEMIA, UNSPECIFIED: ICD-10-CM

## 2023-10-07 LAB
ANION GAP SERPL CALC-SCNC: 6 MMOL/L — SIGNIFICANT CHANGE UP (ref 5–17)
BASOPHILS # BLD AUTO: 0.05 K/UL — SIGNIFICANT CHANGE UP (ref 0–0.2)
BASOPHILS NFR BLD AUTO: 0.6 % — SIGNIFICANT CHANGE UP (ref 0–2)
BUN SERPL-MCNC: 22 MG/DL — HIGH (ref 7–18)
CALCIUM SERPL-MCNC: 9 MG/DL — SIGNIFICANT CHANGE UP (ref 8.4–10.5)
CHLORIDE SERPL-SCNC: 113 MMOL/L — HIGH (ref 96–108)
CO2 SERPL-SCNC: 20 MMOL/L — LOW (ref 22–31)
CREAT SERPL-MCNC: 1.2 MG/DL — SIGNIFICANT CHANGE UP (ref 0.5–1.3)
EGFR: 52 ML/MIN/1.73M2 — LOW
EOSINOPHIL # BLD AUTO: 0.04 K/UL — SIGNIFICANT CHANGE UP (ref 0–0.5)
EOSINOPHIL NFR BLD AUTO: 0.5 % — SIGNIFICANT CHANGE UP (ref 0–6)
GLUCOSE BLDC GLUCOMTR-MCNC: 165 MG/DL — HIGH (ref 70–99)
GLUCOSE BLDC GLUCOMTR-MCNC: 226 MG/DL — HIGH (ref 70–99)
GLUCOSE SERPL-MCNC: 98 MG/DL — SIGNIFICANT CHANGE UP (ref 70–99)
HCT VFR BLD CALC: 42.8 % — SIGNIFICANT CHANGE UP (ref 34.5–45)
HGB BLD-MCNC: 13.9 G/DL — SIGNIFICANT CHANGE UP (ref 11.5–15.5)
IMM GRANULOCYTES NFR BLD AUTO: 0.5 % — SIGNIFICANT CHANGE UP (ref 0–0.9)
LYMPHOCYTES # BLD AUTO: 2.28 K/UL — SIGNIFICANT CHANGE UP (ref 1–3.3)
LYMPHOCYTES # BLD AUTO: 28.5 % — SIGNIFICANT CHANGE UP (ref 13–44)
MCHC RBC-ENTMCNC: 28.1 PG — SIGNIFICANT CHANGE UP (ref 27–34)
MCHC RBC-ENTMCNC: 32.5 GM/DL — SIGNIFICANT CHANGE UP (ref 32–36)
MCV RBC AUTO: 86.5 FL — SIGNIFICANT CHANGE UP (ref 80–100)
MONOCYTES # BLD AUTO: 0.48 K/UL — SIGNIFICANT CHANGE UP (ref 0–0.9)
MONOCYTES NFR BLD AUTO: 6 % — SIGNIFICANT CHANGE UP (ref 2–14)
NEUTROPHILS # BLD AUTO: 5.1 K/UL — SIGNIFICANT CHANGE UP (ref 1.8–7.4)
NEUTROPHILS NFR BLD AUTO: 63.9 % — SIGNIFICANT CHANGE UP (ref 43–77)
NRBC # BLD: 0 /100 WBCS — SIGNIFICANT CHANGE UP (ref 0–0)
PLATELET # BLD AUTO: 304 K/UL — SIGNIFICANT CHANGE UP (ref 150–400)
POTASSIUM SERPL-MCNC: 4 MMOL/L — SIGNIFICANT CHANGE UP (ref 3.5–5.3)
POTASSIUM SERPL-SCNC: 4 MMOL/L — SIGNIFICANT CHANGE UP (ref 3.5–5.3)
RBC # BLD: 4.95 M/UL — SIGNIFICANT CHANGE UP (ref 3.8–5.2)
RBC # FLD: 14.2 % — SIGNIFICANT CHANGE UP (ref 10.3–14.5)
SODIUM SERPL-SCNC: 139 MMOL/L — SIGNIFICANT CHANGE UP (ref 135–145)
WBC # BLD: 7.99 K/UL — SIGNIFICANT CHANGE UP (ref 3.8–10.5)
WBC # FLD AUTO: 7.99 K/UL — SIGNIFICANT CHANGE UP (ref 3.8–10.5)

## 2023-10-07 PROCEDURE — 72125 CT NECK SPINE W/O DYE: CPT | Mod: 26,MA

## 2023-10-07 PROCEDURE — 99222 1ST HOSP IP/OBS MODERATE 55: CPT | Mod: GC

## 2023-10-07 PROCEDURE — 73610 X-RAY EXAM OF ANKLE: CPT | Mod: 26,RT

## 2023-10-07 PROCEDURE — 70450 CT HEAD/BRAIN W/O DYE: CPT | Mod: 26,MA

## 2023-10-07 PROCEDURE — 73562 X-RAY EXAM OF KNEE 3: CPT | Mod: 26,LT

## 2023-10-07 PROCEDURE — 73620 X-RAY EXAM OF FOOT: CPT | Mod: 26,RT

## 2023-10-07 RX ORDER — OXYCODONE AND ACETAMINOPHEN 5; 325 MG/1; MG/1
1 TABLET ORAL EVERY 6 HOURS
Refills: 0 | Status: DISCONTINUED | OUTPATIENT
Start: 2023-10-07 | End: 2023-10-08

## 2023-10-07 RX ORDER — LISINOPRIL 2.5 MG/1
40 TABLET ORAL DAILY
Refills: 0 | Status: DISCONTINUED | OUTPATIENT
Start: 2023-10-07 | End: 2023-10-09

## 2023-10-07 RX ORDER — LIDOCAINE 4 G/100G
1 CREAM TOPICAL EVERY 24 HOURS
Refills: 0 | Status: DISCONTINUED | OUTPATIENT
Start: 2023-10-07 | End: 2023-10-08

## 2023-10-07 RX ORDER — INSULIN LISPRO 100/ML
VIAL (ML) SUBCUTANEOUS AT BEDTIME
Refills: 0 | Status: DISCONTINUED | OUTPATIENT
Start: 2023-10-07 | End: 2023-10-09

## 2023-10-07 RX ORDER — LIDOCAINE 4 G/100G
1 CREAM TOPICAL ONCE
Refills: 0 | Status: COMPLETED | OUTPATIENT
Start: 2023-10-07 | End: 2023-10-07

## 2023-10-07 RX ORDER — ENOXAPARIN SODIUM 100 MG/ML
40 INJECTION SUBCUTANEOUS EVERY 24 HOURS
Refills: 0 | Status: DISCONTINUED | OUTPATIENT
Start: 2023-10-07 | End: 2023-10-08

## 2023-10-07 RX ORDER — ATORVASTATIN CALCIUM 80 MG/1
80 TABLET, FILM COATED ORAL AT BEDTIME
Refills: 0 | Status: DISCONTINUED | OUTPATIENT
Start: 2023-10-07 | End: 2023-10-09

## 2023-10-07 RX ORDER — ACETAMINOPHEN 500 MG
650 TABLET ORAL EVERY 6 HOURS
Refills: 0 | Status: DISCONTINUED | OUTPATIENT
Start: 2023-10-07 | End: 2023-10-08

## 2023-10-07 RX ORDER — INFLUENZA VIRUS VACCINE 15; 15; 15; 15 UG/.5ML; UG/.5ML; UG/.5ML; UG/.5ML
0.5 SUSPENSION INTRAMUSCULAR ONCE
Refills: 0 | Status: DISCONTINUED | OUTPATIENT
Start: 2023-10-07 | End: 2023-10-10

## 2023-10-07 RX ORDER — INSULIN LISPRO 100/ML
VIAL (ML) SUBCUTANEOUS
Refills: 0 | Status: DISCONTINUED | OUTPATIENT
Start: 2023-10-07 | End: 2023-10-09

## 2023-10-07 RX ORDER — ASPIRIN/CALCIUM CARB/MAGNESIUM 324 MG
81 TABLET ORAL DAILY
Refills: 0 | Status: DISCONTINUED | OUTPATIENT
Start: 2023-10-07 | End: 2023-10-08

## 2023-10-07 RX ORDER — METOPROLOL TARTRATE 50 MG
100 TABLET ORAL DAILY
Refills: 0 | Status: DISCONTINUED | OUTPATIENT
Start: 2023-10-07 | End: 2023-10-09

## 2023-10-07 RX ORDER — CLOPIDOGREL BISULFATE 75 MG/1
75 TABLET, FILM COATED ORAL DAILY
Refills: 0 | Status: DISCONTINUED | OUTPATIENT
Start: 2023-10-07 | End: 2023-10-08

## 2023-10-07 RX ADMIN — OXYCODONE AND ACETAMINOPHEN 1 TABLET(S): 5; 325 TABLET ORAL at 20:59

## 2023-10-07 RX ADMIN — Medication 1: at 17:29

## 2023-10-07 RX ADMIN — ENOXAPARIN SODIUM 40 MILLIGRAM(S): 100 INJECTION SUBCUTANEOUS at 23:22

## 2023-10-07 RX ADMIN — Medication 15 MILLIGRAM(S): at 00:30

## 2023-10-07 RX ADMIN — LIDOCAINE 1 PATCH: 4 CREAM TOPICAL at 23:22

## 2023-10-07 RX ADMIN — LIDOCAINE 1 PATCH: 4 CREAM TOPICAL at 03:20

## 2023-10-07 RX ADMIN — LIDOCAINE 1 PATCH: 4 CREAM TOPICAL at 10:47

## 2023-10-07 RX ADMIN — LIDOCAINE 1 PATCH: 4 CREAM TOPICAL at 16:00

## 2023-10-07 RX ADMIN — Medication 0: at 23:25

## 2023-10-07 RX ADMIN — OXYCODONE AND ACETAMINOPHEN 1 TABLET(S): 5; 325 TABLET ORAL at 19:59

## 2023-10-07 RX ADMIN — Medication 650 MILLIGRAM(S): at 00:30

## 2023-10-07 RX ADMIN — ATORVASTATIN CALCIUM 80 MILLIGRAM(S): 80 TABLET, FILM COATED ORAL at 23:21

## 2023-10-07 NOTE — ED PROVIDER NOTE - OBJECTIVE STATEMENT
62-year-old female on clopidogrel presents with knee pain.  Patient states she was walking and missed a step and she fell on her knee.  She complains of left knee pain, right ankle plain.  She denies hitting her head.  She states that pain is so severe she has been feeling weak since.

## 2023-10-07 NOTE — H&P ADULT - ATTENDING COMMENTS
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   Patient seen at bedside. States she fell at home after missing a step. Denies any head trauma or LOC.    Social Hx: current smoker, 40 pack year history     PE: as above; decreased active and passive ROM of left knee   Labs and imaging reviewed by me: CBC, BMP, XR knee     Plan:   -L-knee xray showing patellar fracture  -Pain control: Tylenol, Percocet, Lidocaine patch  -Ortho consulted with recommendation for immobilizer, official note pending   -ACHS FS, ISS   -Hold home diabetes meds  -Continue meds for chronic conditions: HTN, HLD, CAD  -Pain management consult   -PT eval   -OP referral for US thyroid for incidental finding on CT C-spine   -DVT ppx #Intractable pain 2/2 L-patellar fracture   #Thyroid nodule  #Type 2 DM   #HTN   #HLD   #CAD s/p PCI   #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   Patient seen at bedside. States she fell on her knee while at an award ceremony after missing a step. Denies any head trauma or LOC. Patient in immobilizer at time of evaluation, limited ROM. Motor strength in RLE wnl. Patient states pain in the LLE is 7/10, radiates from the knee down to her foot. States she is an active smoker, and smokes 3/4 pack per day. She is not interested in a nicotine patch at this time as she states she can "just pull it off" whenever she wants a cigarette.     Social Hx: current smoker, 40 pack year history, denies alcohol or recreational drug use    PE: as above; decreased active and passive ROM of left knee   Labs and imaging reviewed by me: CBC, BMP, XR    Plan:   -L-knee xray showing patellar fracture  -Pain control: Tylenol, Percocet, Lidocaine patch  -Ortho consulted with recommendation for immobilizer, official note pending   -ACHS FS, ISS; BG within appropriate range  -Hold home diabetes meds  -Continue meds for chronic conditions: HTN, HLD, CAD  -Counseled on smoking cessation, patient interested in quitting, states will follow up with her PCP upon discharge  -Pain management consult   -PT eval   -OP referral for US thyroid for incidental finding on CT C-spine; F/U TSH  -DVT ppx

## 2023-10-07 NOTE — H&P ADULT - ASSESSMENT
62-year-old female from home ambulates with no assistant at baseline w PMH of DM, HTN, HLD, MI (2015 on plavix s/p stenting), R eye retinal detachment (blind on R eye) presents with L knee pain s/p fall. Pt is admitted for patella fracture.

## 2023-10-07 NOTE — H&P ADULT - PROBLEM SELECTOR PLAN 4
- pt takes atorvastatin 80 mg at home  - c/w atorvastatin 80 mg   - f/u lipid profile  - Dash Diet h/o HTN on lisinopril 40 mg, metoprolol 100 at home  Monitor BP  c/w home meds with holding parameters

## 2023-10-07 NOTE — H&P ADULT - HISTORY OF PRESENT ILLNESS
62-year-old female from home ambulates with no assistant at baseline w PMH of DM, HTN, HLD, MI (2015 on plavix s/p stenting), R eye retinal detachment (blind on R eye) presents with L knee pain s/p fall.  Patient states she was walking and missed a step and she fell on her L knee.  She complains of left knee pain, right ankle plain.  She denies hitting her head.  She states that pain is so severe she is unable to ambulate. Patient's knee is mildly swollen on examination unable to bend the knee. X-ray.shows patellar fracture. Pt denies numbness or tingling, weakness, palpitation, CP, SOB, dizziness, HA.

## 2023-10-07 NOTE — PATIENT PROFILE ADULT - FALL HARM RISK - HARM RISK INTERVENTIONS
Assistance with ambulation/Assistance OOB with selected safe patient handling equipment/Communicate Risk of Fall with Harm to all staff/Discuss with provider need for PT consult/Monitor gait and stability/Provide patient with walking aids - walker, cane, crutches/Reinforce activity limits and safety measures with patient and family/Tailored Fall Risk Interventions/Visual Cue: Yellow wristband and red socks/Bed in lowest position, wheels locked, appropriate side rails in place/Call bell, personal items and telephone in reach/Instruct patient to call for assistance before getting out of bed or chair/Non-slip footwear when patient is out of bed/[place] to call system/Physically safe environment - no spills, clutter or unnecessary equipment/Purposeful Proactive Rounding/Room/bathroom lighting operational, light cord in reach Assistance with ambulation/Assistance OOB with selected safe patient handling equipment/Communicate Risk of Fall with Harm to all staff/Discuss with provider need for PT consult/Monitor gait and stability/Provide patient with walking aids - walker, cane, crutches/Reinforce activity limits and safety measures with patient and family/Tailored Fall Risk Interventions/Visual Cue: Yellow wristband and red socks/Bed in lowest position, wheels locked, appropriate side rails in place/Call bell, personal items and telephone in reach/Instruct patient to call for assistance before getting out of bed or chair/Non-slip footwear when patient is out of bed/[place] to call system/Physically safe environment - no spills, clutter or unnecessary equipment/Purposeful Proactive Rounding/Room/bathroom lighting operational, light cord in reach Assistance with ambulation/Assistance OOB with selected safe patient handling equipment/Communicate Risk of Fall with Harm to all staff/Discuss with provider need for PT consult/Monitor gait and stability/Provide patient with walking aids - walker, cane, crutches/Reinforce activity limits and safety measures with patient and family/Tailored Fall Risk Interventions/Visual Cue: Yellow wristband and red socks/Bed in lowest position, wheels locked, appropriate side rails in place/Call bell, personal items and telephone in reach/Instruct patient to call for assistance before getting out of bed or chair/Non-slip footwear when patient is out of bed/[place] to call system/Physically safe environment - no spills, clutter or unnecessary equipment/Purposeful Proactive Rounding/Room/bathroom lighting operational, light cord in reach

## 2023-10-07 NOTE — PATIENT PROFILE ADULT - FUNCTIONAL ASSESSMENT - BASIC MOBILITY 6.
3-calculated by average/Not able to assess (calculate score using [facility] averaging method)  3-calculated by average/Not able to assess (calculate score using [facility] averaging method)  3-calculated by average/Not able to assess (calculate score using [facility] averaging method)

## 2023-10-07 NOTE — ED PROVIDER NOTE - PHYSICAL EXAMINATION
General: mild distress, appears stated age  HEENT: normocephalic, atraumatic   Respiratory: normal work of breathing  MSK: swelling of left knee, bend at 45 degrees, mild swelling tenderness at medial malleolus,   Skin: warm, dry  Neuro: A&Ox3, cranial nerves II-XII intact, 5/5 strength in all extremities, no sensory deficits, normal gait   Psych: appropriate affect

## 2023-10-07 NOTE — ED PROVIDER NOTE - CLINICAL SUMMARY MEDICAL DECISION MAKING FREE TEXT BOX
62-year-old status post a trip and fall on blood thinners.  CT scan of her head because of the blood thinners.  Patient's knee is just mildly swollen but she is really not able to move it though it is flexed.  We will get an x-ray.    X-ray shows patella fracture.I consulted orthopedics who states that if patient is able to walk she could be recently discharged with a knee immobilizer and crutches however if not she should be admitted for pain to medicine.

## 2023-10-07 NOTE — ED PROVIDER NOTE - CARE PLAN
Principal Discharge DX:	Patella fracture   1 Principal Discharge DX:	Patella fracture  Secondary Diagnosis:	Unsteady gait when walking

## 2023-10-07 NOTE — H&P ADULT - PROBLEM SELECTOR PLAN 2
- Patient takes Metformin, Rybelsus, repaglinide, jardiance,  at home  - will hold home medications  - will start sliding scale  - f/u HgA1c  - diabetic diet CT cervical spine showed 1.3 cm hypodense left thyroid nodule.   Recommend follow-up thyroid ultrasound  outpatient f/u

## 2023-10-07 NOTE — PATIENT PROFILE ADULT - NSTOBACCO TYPE_GEN_A_CORE_RD
Cigarettes
I personally performed the service described in the documentation recorded by the scribe in my presence, and it accurately and completely records my words and actions.

## 2023-10-07 NOTE — H&P ADULT - NSHPPHYSICALEXAM_GEN_ALL_CORE
GENERAL: NAD, well-groomed, well-developed  HEAD:  Atraumatic, Normocephalic  EYES: EOMI, PERRLA, conjunctiva and sclera clear  ENMT: No tonsillar erythema, exudates, or enlargement; Moist mucous membranes, Good dentition, No lesions  NECK: Supple, normal appearance, No JVD; Normal thyroid; Trachea midline  NERVOUS SYSTEM:  Alert & Oriented X3,  Motor Strength 5/5 B/L upper and lower extremities, sensation intact  CHEST/LUNG: Lungs clear to auscultation bilaterally, No rales, rhonchi, wheezing   HEART: Regular rate and rhythm; No murmurs, rubs, or gallops  ABDOMEN: Soft, Nontender, Nondistended; Bowel sounds present  EXTREMITIES:  2+ Peripheral Pulses, +tenderness of the L knee, limited range of motion, swelling and edema of L knee, Tenderness R ankle.   LYMPH: No lymphadenopathy noted  SKIN: No rashes or lesions;  Good capillary refill

## 2023-10-07 NOTE — PATIENT PROFILE ADULT - NSPROPTRIGHTSUPPORTNAME_GEN_A_NUR
Mikala Morrison ([facility] [place]) Mikala Morrison ([facility] [place] Mikala Morrison ([facility] [place])

## 2023-10-07 NOTE — H&P ADULT - PROBLEM SELECTOR PLAN 5
pt with hx of MI in 2015 s/p stent  pt takes asa, plavix at home  c/w home meds - pt takes atorvastatin 80 mg at home  - c/w atorvastatin 80 mg   - f/u lipid profile  - Dash Diet

## 2023-10-07 NOTE — PATIENT PROFILE ADULT - OVER THE PAST TWO WEEKS HAVE YOU FELT DOWN, DEPRESSED OR HOPELESS?
Increase dose of ritalin to 50mg.  Monitor response.  Pt requested a trial of increased dose to see if it works better and lasts longer.    See me for fup visit 1 mo.   Can be video visit.   no

## 2023-10-07 NOTE — H&P ADULT - PROBLEM SELECTOR PLAN 6
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 pt with hx of MI in 2015 s/p stent  pt takes asa, plavix at home  c/w home meds

## 2023-10-07 NOTE — H&P ADULT - PROBLEM SELECTOR PLAN 3
h/o HTN on lisinopril 40 mg, metoprolol 100 at home  Monitor BP  c/w home meds with holding parameters - Patient takes Metformin, Rybelsus, repaglinide, jardiance,  at home  - will hold home medications  - will start sliding scale  - f/u HgA1c  - diabetic diet

## 2023-10-07 NOTE — H&P ADULT - NSHPREVIEWOFSYSTEMS_GEN_ALL_CORE
CONSTITUTIONAL: No fever, weight loss, or fatigue  EYES: No eye pain, visual disturbances, or discharge  ENT:  No difficulty hearing, tinnitus, vertigo; No sinus or throat pain  NECK: No pain or stiffness  RESPIRATORY: No cough, wheezing, chills or hemoptysis; No Shortness of Breath  CARDIOVASCULAR: No chest pain, palpitations, passing out, dizziness, or leg swelling  GASTROINTESTINAL: No abdominal or epigastric pain. No nausea, vomiting, or hematemesis; No diarrhea or constipation. No melena or hematochezia.  GENITOURINARY: No dysuria, frequency, hematuria, or incontinence  NEUROLOGICAL: No headaches, memory loss, loss of strength, numbness, or tremors  SKIN: No itching, burning, rashes, or lesions   LYMPH Nodes: No enlarged glands  ENDOCRINE: No heat or cold intolerance; No hair loss  MUSCULOSKELETAL: L knee pain and swelling, R ankle pain  PSYCHIATRIC: No depression, anxiety, mood swings, or difficulty sleeping  HEME/LYMPH: No easy bruising, or bleeding gums  ALLERGY AND IMMUNOLOGIC: No hives or eczema

## 2023-10-07 NOTE — H&P ADULT - PROBLEM SELECTOR PLAN 1
p/w  L knee pain s/p fall  denies hitting her head  CTH, cervical neg  X-ray.shows patellar fracture  s/p lidocaine patch, tylenol, and toradol in ED  orthopedics consulted and recommended knee immobilizer and crutches  f/u PT eval  ambulate with assistant  tylenol for mild pain  fall risk precautions p/w  L knee pain s/p fall  denies hitting her head  CTH, cervical neg  X-ray.shows patellar fracture  s/p lidocaine patch, tylenol, and toradol in ED  orthopedics consulted and recommended knee immobilizer and crutches  f/u PT eval  ambulate with assistant  tylenol for mild pain  percocet for mod pain  fall risk precautions p/w  L knee pain s/p fall  denies hitting her head  CTH, cervical neg  X-ray.shows patellar fracture  s/p lidocaine patch, tylenol, and toradol in ED  orthopedics consulted and recommended knee immobilizer and crutches  f/u PT eval  ambulate with assistant  tylenol for mild pain  percocet for mod pain  fall risk precautions  Consult pain management in AM

## 2023-10-08 ENCOUNTER — TRANSCRIPTION ENCOUNTER (OUTPATIENT)
Age: 63
End: 2023-10-08

## 2023-10-08 DIAGNOSIS — M25.562 PAIN IN LEFT KNEE: ICD-10-CM

## 2023-10-08 LAB
ALBUMIN SERPL ELPH-MCNC: 2.8 G/DL — LOW (ref 3.5–5)
ALP SERPL-CCNC: 87 U/L — SIGNIFICANT CHANGE UP (ref 40–120)
ALT FLD-CCNC: 27 U/L DA — SIGNIFICANT CHANGE UP (ref 10–60)
ANION GAP SERPL CALC-SCNC: 6 MMOL/L — SIGNIFICANT CHANGE UP (ref 5–17)
AST SERPL-CCNC: 15 U/L — SIGNIFICANT CHANGE UP (ref 10–40)
BASOPHILS # BLD AUTO: 0.05 K/UL — SIGNIFICANT CHANGE UP (ref 0–0.2)
BASOPHILS NFR BLD AUTO: 0.8 % — SIGNIFICANT CHANGE UP (ref 0–2)
BILIRUB SERPL-MCNC: 0.4 MG/DL — SIGNIFICANT CHANGE UP (ref 0.2–1.2)
BLD GP AB SCN SERPL QL: SIGNIFICANT CHANGE UP
BUN SERPL-MCNC: 24 MG/DL — HIGH (ref 7–18)
CALCIUM SERPL-MCNC: 8.2 MG/DL — LOW (ref 8.4–10.5)
CHLORIDE SERPL-SCNC: 113 MMOL/L — HIGH (ref 96–108)
CO2 SERPL-SCNC: 23 MMOL/L — SIGNIFICANT CHANGE UP (ref 22–31)
CREAT SERPL-MCNC: 1.05 MG/DL — SIGNIFICANT CHANGE UP (ref 0.5–1.3)
EGFR: 60 ML/MIN/1.73M2 — SIGNIFICANT CHANGE UP
EOSINOPHIL # BLD AUTO: 0.1 K/UL — SIGNIFICANT CHANGE UP (ref 0–0.5)
EOSINOPHIL NFR BLD AUTO: 1.6 % — SIGNIFICANT CHANGE UP (ref 0–6)
GLUCOSE BLDC GLUCOMTR-MCNC: 118 MG/DL — HIGH (ref 70–99)
GLUCOSE BLDC GLUCOMTR-MCNC: 169 MG/DL — HIGH (ref 70–99)
GLUCOSE BLDC GLUCOMTR-MCNC: 207 MG/DL — HIGH (ref 70–99)
GLUCOSE BLDC GLUCOMTR-MCNC: 256 MG/DL — HIGH (ref 70–99)
GLUCOSE SERPL-MCNC: 183 MG/DL — HIGH (ref 70–99)
HCT VFR BLD CALC: 37.6 % — SIGNIFICANT CHANGE UP (ref 34.5–45)
HCV AB S/CO SERPL IA: 0.09 S/CO — SIGNIFICANT CHANGE UP (ref 0–0.99)
HCV AB SERPL-IMP: SIGNIFICANT CHANGE UP
HGB BLD-MCNC: 12.2 G/DL — SIGNIFICANT CHANGE UP (ref 11.5–15.5)
IMM GRANULOCYTES NFR BLD AUTO: 0.5 % — SIGNIFICANT CHANGE UP (ref 0–0.9)
LYMPHOCYTES # BLD AUTO: 1.48 K/UL — SIGNIFICANT CHANGE UP (ref 1–3.3)
LYMPHOCYTES # BLD AUTO: 24 % — SIGNIFICANT CHANGE UP (ref 13–44)
MAGNESIUM SERPL-MCNC: 2.3 MG/DL — SIGNIFICANT CHANGE UP (ref 1.6–2.6)
MCHC RBC-ENTMCNC: 28.2 PG — SIGNIFICANT CHANGE UP (ref 27–34)
MCHC RBC-ENTMCNC: 32.4 GM/DL — SIGNIFICANT CHANGE UP (ref 32–36)
MCV RBC AUTO: 87 FL — SIGNIFICANT CHANGE UP (ref 80–100)
MONOCYTES # BLD AUTO: 0.48 K/UL — SIGNIFICANT CHANGE UP (ref 0–0.9)
MONOCYTES NFR BLD AUTO: 7.8 % — SIGNIFICANT CHANGE UP (ref 2–14)
NEUTROPHILS # BLD AUTO: 4.03 K/UL — SIGNIFICANT CHANGE UP (ref 1.8–7.4)
NEUTROPHILS NFR BLD AUTO: 65.3 % — SIGNIFICANT CHANGE UP (ref 43–77)
NRBC # BLD: 0 /100 WBCS — SIGNIFICANT CHANGE UP (ref 0–0)
PHOSPHATE SERPL-MCNC: 3.4 MG/DL — SIGNIFICANT CHANGE UP (ref 2.5–4.5)
PLATELET # BLD AUTO: 286 K/UL — SIGNIFICANT CHANGE UP (ref 150–400)
POTASSIUM SERPL-MCNC: 3.9 MMOL/L — SIGNIFICANT CHANGE UP (ref 3.5–5.3)
POTASSIUM SERPL-SCNC: 3.9 MMOL/L — SIGNIFICANT CHANGE UP (ref 3.5–5.3)
PROT SERPL-MCNC: 6.1 G/DL — SIGNIFICANT CHANGE UP (ref 6–8.3)
RBC # BLD: 4.32 M/UL — SIGNIFICANT CHANGE UP (ref 3.8–5.2)
RBC # FLD: 14.3 % — SIGNIFICANT CHANGE UP (ref 10.3–14.5)
SODIUM SERPL-SCNC: 142 MMOL/L — SIGNIFICANT CHANGE UP (ref 135–145)
TSH SERPL-MCNC: 0.71 UU/ML — SIGNIFICANT CHANGE UP (ref 0.34–4.82)
WBC # BLD: 6.17 K/UL — SIGNIFICANT CHANGE UP (ref 3.8–10.5)
WBC # FLD AUTO: 6.17 K/UL — SIGNIFICANT CHANGE UP (ref 3.8–10.5)

## 2023-10-08 PROCEDURE — 99253 IP/OBS CNSLTJ NEW/EST LOW 45: CPT | Mod: 57

## 2023-10-08 PROCEDURE — 99253 IP/OBS CNSLTJ NEW/EST LOW 45: CPT

## 2023-10-08 PROCEDURE — 99232 SBSQ HOSP IP/OBS MODERATE 35: CPT

## 2023-10-08 RX ORDER — OXYCODONE HYDROCHLORIDE 5 MG/1
5 TABLET ORAL EVERY 4 HOURS
Refills: 0 | Status: DISCONTINUED | OUTPATIENT
Start: 2023-10-08 | End: 2023-10-09

## 2023-10-08 RX ORDER — POLYETHYLENE GLYCOL 3350 17 G/17G
17 POWDER, FOR SOLUTION ORAL DAILY
Refills: 0 | Status: DISCONTINUED | OUTPATIENT
Start: 2023-10-08 | End: 2023-10-09

## 2023-10-08 RX ORDER — ACETAMINOPHEN 500 MG
1000 TABLET ORAL EVERY 8 HOURS
Refills: 0 | Status: DISCONTINUED | OUTPATIENT
Start: 2023-10-08 | End: 2023-10-09

## 2023-10-08 RX ORDER — LIDOCAINE 4 G/100G
2 CREAM TOPICAL DAILY
Refills: 0 | Status: DISCONTINUED | OUTPATIENT
Start: 2023-10-08 | End: 2023-10-09

## 2023-10-08 RX ORDER — SENNA PLUS 8.6 MG/1
2 TABLET ORAL AT BEDTIME
Refills: 0 | Status: DISCONTINUED | OUTPATIENT
Start: 2023-10-08 | End: 2023-10-09

## 2023-10-08 RX ORDER — POVIDONE-IODINE 5 %
1 AEROSOL (ML) TOPICAL ONCE
Refills: 0 | Status: COMPLETED | OUTPATIENT
Start: 2023-10-08 | End: 2023-10-09

## 2023-10-08 RX ORDER — CHLORHEXIDINE GLUCONATE 213 G/1000ML
1 SOLUTION TOPICAL ONCE
Refills: 0 | Status: COMPLETED | OUTPATIENT
Start: 2023-10-08 | End: 2023-10-09

## 2023-10-08 RX ADMIN — LISINOPRIL 40 MILLIGRAM(S): 2.5 TABLET ORAL at 06:12

## 2023-10-08 RX ADMIN — OXYCODONE AND ACETAMINOPHEN 1 TABLET(S): 5; 325 TABLET ORAL at 06:12

## 2023-10-08 RX ADMIN — LIDOCAINE 2 PATCH: 4 CREAM TOPICAL at 21:23

## 2023-10-08 RX ADMIN — Medication 1000 MILLIGRAM(S): at 22:53

## 2023-10-08 RX ADMIN — OXYCODONE AND ACETAMINOPHEN 1 TABLET(S): 5; 325 TABLET ORAL at 07:22

## 2023-10-08 RX ADMIN — CLOPIDOGREL BISULFATE 75 MILLIGRAM(S): 75 TABLET, FILM COATED ORAL at 12:26

## 2023-10-08 RX ADMIN — SENNA PLUS 2 TABLET(S): 8.6 TABLET ORAL at 21:53

## 2023-10-08 RX ADMIN — Medication 3: at 17:36

## 2023-10-08 RX ADMIN — Medication 0: at 21:56

## 2023-10-08 RX ADMIN — Medication 81 MILLIGRAM(S): at 12:26

## 2023-10-08 RX ADMIN — Medication 1000 MILLIGRAM(S): at 13:42

## 2023-10-08 RX ADMIN — LIDOCAINE 1 PATCH: 4 CREAM TOPICAL at 11:00

## 2023-10-08 RX ADMIN — LIDOCAINE 1 PATCH: 4 CREAM TOPICAL at 08:26

## 2023-10-08 RX ADMIN — Medication 1000 MILLIGRAM(S): at 21:53

## 2023-10-08 RX ADMIN — Medication 1: at 08:20

## 2023-10-08 RX ADMIN — Medication 1000 MILLIGRAM(S): at 14:43

## 2023-10-08 RX ADMIN — POLYETHYLENE GLYCOL 3350 17 GRAM(S): 17 POWDER, FOR SOLUTION ORAL at 12:27

## 2023-10-08 RX ADMIN — ATORVASTATIN CALCIUM 80 MILLIGRAM(S): 80 TABLET, FILM COATED ORAL at 21:53

## 2023-10-08 RX ADMIN — Medication 100 MILLIGRAM(S): at 06:12

## 2023-10-08 RX ADMIN — LIDOCAINE 2 PATCH: 4 CREAM TOPICAL at 12:27

## 2023-10-08 NOTE — CHART NOTE - NSCHARTNOTEFT_GEN_A_CORE
EVENT:  Plavix on was d/c fro the OR  Pt need confirmatory T&C    Vital Signs Last 24 Hrs  T(C): 36.2 (08 Oct 2023 14:17), Max: 36.6 (07 Oct 2023 22:02)  T(F): 97.2 (08 Oct 2023 14:17), Max: 97.9 (07 Oct 2023 22:02)  HR: 68 (08 Oct 2023 14:17) (68 - 83)  BP: 118/74 (08 Oct 2023 14:17) (118/74 - 145/81)  BP(mean): --  RR: 17 (08 Oct 2023 14:17) (17 - 18)  SpO2: 97% (08 Oct 2023 14:17) (96% - 97%)    Parameters below as of 08 Oct 2023 14:17  Patient On (Oxygen Delivery Method): room air EVENT:  Plavix  d/c for possible OR , confirmatory T&C and coagulation ordered    BRIEF HPI: 61 y/o F fr home. PMH  DM, HTN, HDL, MI (2015 on Plavix s/p stenting), R eye retinal detachment (blind on R eye). C/o  L knee pain s/p fall. Admitted for left patellar fx. CT head- No evidence of acute trauma. Indeterminate ovoid densities within the right globe, possibly iatrogenic in etiology. Further workup as warranted.1.3 cm hypodense left thyroid nodule. Recommend correlation with nonemergent follow-up thyroid ultrasound.    Vital Signs Last 24 Hrs  T(C): 36.2 (08 Oct 2023 14:17), Max: 36.6 (07 Oct 2023 22:02)  T(F): 97.2 (08 Oct 2023 14:17), Max: 97.9 (07 Oct 2023 22:02)  HR: 68 (08 Oct 2023 14:17) (68 - 83)  BP: 118/74 (08 Oct 2023 14:17) (118/74 - 145/81)  BP(mean): --  RR: 17 (08 Oct 2023 14:17) (17 - 18)  SpO2: 97% (08 Oct 2023 14:17) (96% - 97%)    Parameters below as of 08 Oct 2023 14:17  Patient On (Oxygen Delivery Method): room air    PLAN  Pre-op for Surgery 10/9/23, pending patient's acceptance of and consent to surgery. Procedure: ORIF of left patella fracture.

## 2023-10-08 NOTE — CONSULT NOTE ADULT - PROBLEM SELECTOR RECOMMENDATION 9
Pt with acute left knee pain which is somatic in nature due to left patellar fracture.   Opioid pain recommendations   -  Oxycodone 5 mg PO q 4 hours PRN severe pain. Monitor for sedation/ respiratory depression.   Non-opioid pain recommendations   - Acetaminophen 1 gram PO q 8 hours x 3 days. Monitor LFTs  - Lidoderm 4% patch daily.   - Avoid NSAIDs- hx MI.   Bowel Regimen  - Continue Miralax 17G PO daily  - Continue Senna 2 tablets at bedtime for constipation  Mild pain (score 1-3)  - Non-pharmacological pain treatment recommendations  - Warm/ Cool packs PRN   - Repositioning extremity, elevation, imagery, relaxation, distraction.  - Physical therapy OOB if no contraindications   Recommendations discussed with primary team and RN

## 2023-10-08 NOTE — CONSULT NOTE ADULT - CONSULT REQUESTED DATE/TIME
Chief complaint:  Pelvic cramping    HPI:  The patient is a 27-year-old female who had an IUD placed 2 months ago.  She states that in the past month she has had constant pelvic cramping which is quite significant.  She also has almost daily vaginal spotting.  She denies other complaints.      Objective:  Nurse's vitals are noted and are normal.  Her external genitalia are normal.  Her vagina has a small amount of brown discharge in the vault.  Her cervix is grossly normal.  The IUD strings are visible and normal length.      Assessment:  Pelvic cramping in a patient with an IUD in place that appears well positioned on speculum exam     Plan:  I counseled the patient that I can not determine if the IUD is correctly positioned in the uterus based on a speculum exam.  I recommended a pelvic ultrasound and the patient agreed.  That is ordered.  We will call her with the results.  She was examined with Libby Gonzalez as chaperone.  
08-Oct-2023 13:41
08-Oct-2023 09:00

## 2023-10-08 NOTE — CONSULT NOTE ADULT - ASSESSMENT
Confidential Drug Utilization Report  Search Terms: Molly Morrison, 1960Search Date: 10/08/2023 09:00:37 AM  The Drug Utilization Report below displays all of the controlled substance prescriptions, if any, that your patient has filled in the last twelve months. The information displayed on this report is compiled from pharmacy submissions to the Department, and accurately reflects the information as submitted by the pharmacies.    This report was requested by: Jolynn Steward | Reference #: [id number]    There are no results for the search terms that you entered. Confidential Drug Utilization Report  Search Terms: Molly Morrison, 1960Search Date: 10/08/2023 09:00:37 AM  The Drug Utilization Report below displays all of the controlled substance prescriptions, if any, that your patient has filled in the last twelve months. The information displayed on this report is compiled from pharmacy submissions to the Department, and accurately reflects the information as submitted by the pharmacies.    This report was requested by: Jolynn Steward | Reference #: [id number]    There are no results for the search terms that you entered. Confidential Drug Utilization Report  Search Terms: Molly Morrison, 1960Search Date: 10/08/2023 09:00:37 AM  The Drug Utilization Report below displays all of the controlled substance prescriptions, if any, that your patient has filled in the last twelve months. The information displayed on this report is compiled from pharmacy submissions to the Department, and accurately reflects the information as submitted by the pharmacies.    This report was requested by: Jolynn Steward | Reference #: [id number]    There are no results for the search terms that you entered.

## 2023-10-08 NOTE — CONSULT NOTE ADULT - SUBJECTIVE AND OBJECTIVE BOX
Pt Name: NANCY BRUSH  MRN: [id number]    ORTHOPAEDIC SURGERY CONSULT    Diagnosis:    62yFemaleHPI:  62-year-old female from home ambulates with no assistant at baseline w PMH of DM, HTN, HLD, MI (2015 on plavix s/p stenting), R eye retinal detachment (blind on R eye) presents with L knee pain s/p fall.  Patient states she was walking and missed a step and she fell on her L knee.  She complains of left knee pain, right ankle plain.  She denies hitting her head.  She states that pain is so severe she is unable to ambulate. Patient's knee is mildly swollen on examination unable to bend the knee. X-ray.shows patellar fracture. Pt denies numbness or tingling, weakness, palpitation, CP, SOB, dizziness, HA. (07 Oct 2023 12:47)    Patient seen and evaluated at bedside with Dr. Chandra. Patient is a 62 year old female who ambulates independently at baseline with PMHx of DM, HTN. HLD. MI (2015 on plavix s/p stenting). R Eye retinal detachment (blind on R eye) presents with L knee pain s/p fall. Patient states that she was walking and missed a step which caused her to fall on her L Knee. Patient complains of consistent right knee pain that is worsened by movement of the left lower extremity. Notes that the pain is improved with lidocaine patches and medications. Patient denies any numbness or tingling of the lower extremity. Pt denies Fever, Chest pain, SOB, dyspnea, paresthesias, N/V/D, abdominal pain, syncope, or pain anywhere else.     AMBULATION: Baseline Ambulation  [xxx] independent   [     ] With Cane   [     ] With Walker   [     ]  Bedbound   [     ] Pivot transfers to Wheelchair only      PAST MEDICAL & SURGICAL HISTORY:  HTN (hypertension)      HLD (hyperlipidemia)      Myocardial infarction      Old retinal detachment of right eye      DM (diabetes mellitus)      S/P coronary artery stent placement          ALLERGIES: No Known Allergies        PHYSICAL EXAM:    Vital Signs Last 24 Hrs  T(C): 36.6 (08 Oct 2023 05:20), Max: 36.9 (07 Oct 2023 15:37)  T(F): 97.8 (08 Oct 2023 05:20), Max: 98.4 (07 Oct 2023 15:37)  HR: 78 (08 Oct 2023 05:20) (78 - 85)  BP: 136/80 (08 Oct 2023 05:20) (136/80 - 145/81)  BP(mean): --  RR: 18 (08 Oct 2023 05:20) (18 - 18)  SpO2: 96% (08 Oct 2023 05:20) (96% - 99%)    Parameters below as of 08 Oct 2023 05:20  Patient On (Oxygen Delivery Method): room air        Gen: well developed, well nourished, comfortable  MSK:  LLE:   Erythema, edema noted to the left patella. Knee immobilizer was intact. Patient unable to straight leg raise. Extensor mechanisms are not intact.   Skin pink, warm. No open lesions.  no ct  calves soft  DP/PT 2+, brisk b/l  nvi silt  5/5 strength ehl/ta/gastroc b/l.    LABS:                        12.2   6.17  )-----------( 286      ( 08 Oct 2023 08:20 )             37.6     10-08    142  |  113<H>  |  24<H>  ----------------------------<  183<H>  3.9   |  23  |  1.05    Ca    8.2<L>      08 Oct 2023 08:20  Phos  3.4     10-08  Mg     2.3     10-08    TPro  6.1  /  Alb  2.8<L>  /  TBili  0.4  /  DBili  x   /  AST  15  /  ALT  27  /  AlkPhos  87  10-08        RADIOLOGY:   Wet Read of left knee x-ray shows fracture of left patella near distal pole.    A/P:   62y Female with:     #  -  Recommendation: [xxx] Surgical treatment/fixation as patient's extensor mechanism of left knee is not intact.   -  All the patient's questions were answered. Pt was explained the Plan, mentioned above, thoroughly.  Risks/benefits, complications were also explained, which includes but not limited to: persistent, infection, death, PNA, thrombombolism, etc. Pt understands.   -  Pain control  -  DVT prophylaxis  - Admit to medicine  - Condition: Stable  - Transfer to Floor  - Pre-op for Surgery 10/9/23, pending patient's acceptance of and consent to surgery.   - Procedure: ORIF of left patella fracture  - Surgeon: Dr. Mekhi Chandra  - Clearance: Pending  - Consent: Pending  - Medical Clearance called  - Keep NPO past midnight tonight  - IVF when NPO  - Hold Anticoagulants   - Keep affected extremity elevated on 2 pillows  - Pain Management  - DVT ppx with Venodynes and Heparin can be given until midnight tonight  - Case seen and Discussed with Dr. Mekhi Chandra  Pt Name: NANCY BRUSH  MRN: [id number]    ORTHOPAEDIC SURGERY CONSULT    Diagnosis:    62yFemaleHPI:  62-year-old female from home ambulates with no assistant at baseline w PMH of DM, HTN, HLD, MI (2015 on plavix s/p stenting), R eye retinal detachment (blind on R eye) presents with L knee pain s/p fall.  Patient states she was walking and missed a step and she fell on her L knee.  She complains of left knee pain, right ankle plain.  She denies hitting her head.  She states that pain is so severe she is unable to ambulate. Patient's knee is mildly swollen on examination unable to bend the knee. X-ray.shows patellar fracture. Pt denies numbness or tingling, weakness, palpitation, CP, SOB, dizziness, HA. (07 Oct 2023 12:47)    Patient seen and evaluated at bedside with Dr. Chandra. Patient is a 62 year old female who ambulates independently at baseline with PMHx of DM, HTN. HLD. MI (2015 on plavix s/p stenting). R Eye retinal detachment (blind on R eye) presents with L knee pain s/p fall. Patient states that she was walking and missed a step which caused her to fall on her L Knee. Patient complains of consistent right knee pain that is worsened by movement of the left lower extremity. Notes that the pain is improved with lidocaine patches and medications. Patient denies any numbness or tingling of the lower extremity. Pt denies Fever, Chest pain, SOB, dyspnea, paresthesias, N/V/D, abdominal pain, syncope, or pain anywhere else.     AMBULATION: Baseline Ambulation  [xxx] independent   [     ] With Cane   [     ] With Walker   [     ]  Bedbound   [     ] Pivot transfers to Wheelchair only      PAST MEDICAL & SURGICAL HISTORY:  HTN (hypertension)      HLD (hyperlipidemia)      Myocardial infarction      Old retinal detachment of right eye      DM (diabetes mellitus)      S/P coronary artery stent placement          ALLERGIES: No Known Allergies        PHYSICAL EXAM:    Vital Signs Last 24 Hrs  T(C): 36.6 (08 Oct 2023 05:20), Max: 36.9 (07 Oct 2023 15:37)  T(F): 97.8 (08 Oct 2023 05:20), Max: 98.4 (07 Oct 2023 15:37)  HR: 78 (08 Oct 2023 05:20) (78 - 85)  BP: 136/80 (08 Oct 2023 05:20) (136/80 - 145/81)  BP(mean): --  RR: 18 (08 Oct 2023 05:20) (18 - 18)  SpO2: 96% (08 Oct 2023 05:20) (96% - 99%)    Parameters below as of 08 Oct 2023 05:20  Patient On (Oxygen Delivery Method): room air        Gen: well developed, well nourished, comfortable  MSK:  LLE:   Erythema, edema noted to the left patella. Knee immobilizer was intact. Patient unable to straight leg raise. Extensor mechanisms are not intact.   Skin pink, warm. No open lesions.  no ct  calves soft  DP/PT 2+, brisk b/l  nvi silt  5/5 strength ehl/ta/gastroc b/l.    LABS:                        12.2   6.17  )-----------( 286      ( 08 Oct 2023 08:20 )             37.6     10-08    142  |  113<H>  |  24<H>  ----------------------------<  183<H>  3.9   |  23  |  1.05    Ca    8.2<L>      08 Oct 2023 08:20  Phos  3.4     10-08  Mg     2.3     10-08    TPro  6.1  /  Alb  2.8<L>  /  TBili  0.4  /  DBili  x   /  AST  15  /  ALT  27  /  AlkPhos  87  10-08        RADIOLOGY:   Wet Read of left knee x-ray shows fracture of left patella near distal pole.    A/P:   62y Female with:     #  -  Recommendation: [xxx] Surgical treatment/fixation as patient's extensor mechanism of left knee is not intact.   -  All the patient's questions were answered. Pt was explained the Plan, mentioned above, thoroughly.  Risks/benefits, complications were also explained, which includes but not limited to: persistent, infection, death, PNA, thrombombolism, etc. Pt understands.   -  Pain control  -  DVT prophylaxis  - Admit to medicine  - Condition: Stable  - Transfer to Floor  - Pre-op for Surgery 10/9/23, pending patient's acceptance of and consent to surgery.   - Procedure: ORIF of left patella fracture  - Surgeon: Dr. Mekhi Chandra  - Clearance: Pending  - Consent: Pending  - Medical Clearance called  - Keep NPO past midnight tonight  - IVF when NPO  - Hold Anticoagulants   - Keep affected extremity elevated on 2 pillows  - Pain Management  - DVT ppx with Venodynes and Heparin can be given until midnight tonight  - Case seen and Discussed with Dr. Mekhi Chandra  Pt Name: NANCY BRUSH  MRN: [id number]    ORTHOPAEDIC SURGERY CONSULT    Diagnosis:    62yFemaleHPI:  62-year-old female from home ambulates with no assistant at baseline w PMH of DM, HTN, HLD, MI (2015 on plavix s/p stenting), R eye retinal detachment (blind on R eye) presents with L knee pain s/p fall.  Patient states she was walking and missed a step and she fell on her L knee.  She complains of left knee pain, right ankle plain.  She denies hitting her head.  She states that pain is so severe she is unable to ambulate. Patient's knee is mildly swollen on examination unable to bend the knee. X-ray.shows patellar fracture. Pt denies numbness or tingling, weakness, palpitation, CP, SOB, dizziness, HA. (07 Oct 2023 12:47)    Patient seen and evaluated at bedside with Dr. Chandra. Patient is a 62 year old female who ambulates independently at baseline with PMHx of DM, HTN. HLD. MI (2015 on plavix s/p stenting). R Eye retinal detachment (blind on R eye) presents with L knee pain s/p fall. Patient states that she was walking and missed a step which caused her to fall on her L Knee. Patient complains of consistent right knee pain that is worsened by movement of the left lower extremity. Notes that the pain is improved with lidocaine patches and medications. Patient denies any numbness or tingling of the lower extremity. Pt denies Fever, Chest pain, SOB, dyspnea, paresthesias, N/V/D, abdominal pain, syncope, or pain anywhere else.     AMBULATION: Baseline Ambulation  [xxx] independent   [     ] With Cane   [     ] With Walker   [     ]  Bedbound   [     ] Pivot transfers to Wheelchair only      PAST MEDICAL & SURGICAL HISTORY:  HTN (hypertension)      HLD (hyperlipidemia)      Myocardial infarction      Old retinal detachment of right eye      DM (diabetes mellitus)      S/P coronary artery stent placement          ALLERGIES: No Known Allergies        PHYSICAL EXAM:    Vital Signs Last 24 Hrs  T(C): 36.6 (08 Oct 2023 05:20), Max: 36.9 (07 Oct 2023 15:37)  T(F): 97.8 (08 Oct 2023 05:20), Max: 98.4 (07 Oct 2023 15:37)  HR: 78 (08 Oct 2023 05:20) (78 - 85)  BP: 136/80 (08 Oct 2023 05:20) (136/80 - 145/81)  BP(mean): --  RR: 18 (08 Oct 2023 05:20) (18 - 18)  SpO2: 96% (08 Oct 2023 05:20) (96% - 99%)    Parameters below as of 08 Oct 2023 05:20  Patient On (Oxygen Delivery Method): room air        Gen: well developed, well nourished, comfortable  MSK:  LLE:   Erythema, edema noted to the left patella. Knee immobilizer was intact. Patient unable to straight leg raise. Extensor mechanisms are not intact.   Skin pink, warm. No open lesions.  no ct  calves soft  DP/PT 2+, brisk b/l  nvi silt  5/5 strength ehl/ta/gastroc b/l.    LABS:                        12.2   6.17  )-----------( 286      ( 08 Oct 2023 08:20 )             37.6     10-08    142  |  113<H>  |  24<H>  ----------------------------<  183<H>  3.9   |  23  |  1.05    Ca    8.2<L>      08 Oct 2023 08:20  Phos  3.4     10-08  Mg     2.3     10-08    TPro  6.1  /  Alb  2.8<L>  /  TBili  0.4  /  DBili  x   /  AST  15  /  ALT  27  /  AlkPhos  87  10-08        RADIOLOGY:   Wet Read of left knee x-ray shows fracture of left patella near distal pole.    A/P:   62y Female with:     #  -  Recommendation: [xxx] Surgical treatment/fixation as patient's extensor mechanism of left knee is not intact.   -  All the patient's questions were answered. Pt was explained the Plan, mentioned above, thoroughly.  Risks/benefits, complications were also explained, which includes but not limited to: persistent, infection, death, PNA, thrombombolism, etc. Pt understands.   -  Pain control  -  DVT prophylaxis  - Admit to medicine  - Condition: Stable  - Transfer to Floor  - Pre-op for Surgery 10/9/23, pending patient's acceptance of and consent to surgery.   - Procedure: ORIF of left patella fracture  - Surgeon: Dr. Mekhi Chandra  - Clearance: Pending  - Consent: Pending  - Medical Clearance called  - Keep NPO past midnight tonight  - IVF when NPO  - Hold Anticoagulants   - Keep affected extremity elevated on 2 pillows  - Pain Management  - DVT ppx with Venodynes and Heparin can be given until midnight tonight  - Case seen and Discussed with Dr. Mekhi Chandra

## 2023-10-08 NOTE — CONSULT NOTE ADULT - SUBJECTIVE AND OBJECTIVE BOX
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  Patient states she was walking and missed a step and she fell on her L knee.  She complains of left knee pain, right ankle plain.  She denies hitting her head.  She states that pain is so severe she is unable to ambulate. Patient's knee is mildly swollen on examination unable to bend the knee. X-ray.shows patellar fracture. Pt denies numbness or tingling, weakness, palpitation, CP, SOB, dizziness, HA. (07 Oct 2023 12:47)     Pt is admitted for left patellar fx. CT head- No evidence of acute trauma.Indeterminate ovoid densities within the right globe, possibly iatrogenic in etiology. Clinical correlation is recommended with further workup as warranted.1.3 cm hypodense left thyroid nodule. Recommend correlation with   nonemergent follow-up thyroid ultrasound.   Pain consulted for left knee pain on 10/7.  Pt seen and examined at bedside. Reports left knee pain score 7-8/10  SCALE USED: (1-10 VNRS). Pt describes pain as aching, throbbing, non- radiating, alleviated by pain medication, exacerbated by movement. Pt with left knee immobilizer. Also reports occasional mild right dorsal foot pain. Pt tolerating PO diet. Denies lethargy, chest pain, SOB, nausea, vomiting, constipation. Reports last BM 10/6. Patient stated goal for pain control: to be able to take deep breaths, get out of bed to chair and ambulate with tolerable pain control. Pt denies taking medications for pain at home.     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 (07 Oct 2023 12:47)   [X ] Denies ETOH use, illicit drug use and smoking    Allergies    No Known Allergies    MEDICATIONS  (STANDING):  acetaminophen     Tablet .. 1000 milliGRAM(s) Oral every 8 hours  aspirin enteric coated 81 milliGRAM(s) Oral daily  atorvastatin 80 milliGRAM(s) Oral at bedtime  chlorhexidine 2% Cloths 1 Application(s) Topical once  clopidogrel Tablet 75 milliGRAM(s) Oral daily  influenza   Vaccine 0.5 milliLiter(s) IntraMuscular once  insulin lispro (ADMELOG) corrective regimen sliding scale   SubCutaneous at bedtime  insulin lispro (ADMELOG) corrective regimen sliding scale   SubCutaneous three times a day before meals  lidocaine   4% Patch 2 Patch Transdermal daily  lisinopril 40 milliGRAM(s) Oral daily  metoprolol succinate  milliGRAM(s) Oral daily  polyethylene glycol 3350 17 Gram(s) Oral daily  povidone iodine 5% Nasal Swab 1 Application(s) Both Nostrils once  senna 2 Tablet(s) Oral at bedtime    MEDICATIONS  (PRN):  oxyCODONE    IR 5 milliGRAM(s) Oral every 4 hours PRN Severe Pain (7 - 10)      Vital Signs Last 24 Hrs  T(C): 36.2 (08 Oct 2023 14:17), Max: 36.9 (07 Oct 2023 15:37)  T(F): 97.2 (08 Oct 2023 14:17), Max: 98.4 (07 Oct 2023 15:37)  HR: 68 (08 Oct 2023 14:17) (68 - 85)  BP: 118/74 (08 Oct 2023 14:17) (118/74 - 145/81)  BP(mean): --  RR: 17 (08 Oct 2023 14:17) (17 - 18)  SpO2: 97% (08 Oct 2023 14:17) (96% - 99%)    Parameters below as of 08 Oct 2023 14:17  Patient On (Oxygen Delivery Method): room air        LABS: Reviewed.                          12.2   6.17  )-----------( 286      ( 08 Oct 2023 08:20 )             37.6     10-08    142  |  113<H>  |  24<H>  ----------------------------<  183<H>  3.9   |  23  |  1.05    Ca    8.2<L>      08 Oct 2023 08:20  Phos  3.4     10-08  Mg     2.3     10-08    TPro  6.1  /  Alb  2.8<L>  /  TBili  0.4  /  DBili  x   /  AST  15  /  ALT  27  /  AlkPhos  87  10-08      LIVER FUNCTIONS - ( 08 Oct 2023 08:20 )  Alb: 2.8 g/dL / Pro: 6.1 g/dL / ALK PHOS: 87 U/L / ALT: 27 U/L DA / AST: 15 U/L / GGT: x           Urinalysis Basic - ( 08 Oct 2023 08:20 )    Color: x / Appearance: x / SG: x / pH: x  Gluc: 183 mg/dL / Ketone: x  / Bili: x / Urobili: x   Blood: x / Protein: x / Nitrite: x   Leuk Esterase: x / RBC: x / WBC x   Sq Epi: x / Non Sq Epi: x / Bacteria: x      CAPILLARY BLOOD GLUCOSE      POCT Blood Glucose.: 118 mg/dL (08 Oct 2023 11:47)  POCT Blood Glucose.: 169 mg/dL (08 Oct 2023 08:17)  POCT Blood Glucose.: 226 mg/dL (07 Oct 2023 23:18)  POCT Blood Glucose.: 165 mg/dL (07 Oct 2023 17:13)        Radiology: Reviewed.   < from: CT Head No Cont (10.07.23 @ 03:01) >    ACC: [id number] EXAM:  CT BRAIN   ORDERED BY: ANASTACIA HURTADO     ACC: [id number] EXAM:  CT CERVICAL SPINE   ORDERED BY: ANASTACIA HURTADO     PROCEDURE DATE:  10/07/2023          INTERPRETATION:  Clinical Indication: Status post fall.    Comparison: None    Technique: Noncontrast axial CT images of the head and cervical spine   were obtained. Coronal and sagittal reconstructions were performed.    Head CT Findings:  The ventricles and sulci are normal in size for the patient's stated age.    No acute intracranial hemorrhage is identified.  There is no extra-axial   fluid collection. No mass effect or midline shift is seen.  There is no   evidence of acute territorial infarct.  There are periventricular and   subcortical white matter hypodensities, which are nonspecific, but likely   reflect mild microvascular ischemic disease.  There is bilateral maxillary sinus mucosal thickening with associated   near complete opacification on the right and partial desiccation left.   There is mild sphenoid and ethmoid sinus mucosal thickening. The mastoid   air cells are well aerated.  No acute osseous abnormality is seen.   Nonspecific high right 1.2 cm scalp nodule is noted.  Two ovoid densities are noted within the right globe, largest measuring   up to 1.3 cm, indeterminate, possibly iatrogenic in etiology.    Cervical Spine CT Findings:  Straightening of usual cervical lordosis is likely related to muscle   spasm or positioning.  There is no spondylolisthesis.  There is no acute   displaced fracture.  There is no prevertebral soft tissue swelling.  The   vertebral body heights are maintained. 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 There are multilevel degenerative changes   characterized by disc osteophyte complexesand facet and uncinate   hypertrophy. This results in mild canal stenosis and multilevel neural   foraminal narrowing. 1.3 cm hypodense left thyroid nodule.    Impression:  No evidence of acute trauma.    Indeterminate ovoid densities within the right globe, possibly iatrogenic   in etiology. Clinical correlation is recommended with further workup as   warranted.    1.3 cm hypodense left thyroid nodule. Recommend correlation with   nonemergent follow-up thyroid ultrasound.    --- End of Report ---            DAVID ESPITIA MD; Attending Radiologist  This document has been electronically signed. Oct  7 2023  4:16AM    < end of copied text >      ORT Score -   Family Hx of substance abuse	Female	      Male  Alcohol 	                                           1                     3  Illegal drugs	                                   2                     3  Rx drugs                                           4 	                  4  Personal Hx of substance abuse		  Alcohol 	                                          3	                  3  Illegal drugs                                     4	                  4  Rx drugs                                            5 	                  5  Age between 16- 45 years	           1                     1  hx preadolescent sexual abuse	   3 	                  0  Psychological disease		  ADD, OCD, bipolar, schizophrenia   2	          2  Depression                                           1 	          1  Total: 0    a score of 3 or lower indicates low risk for opioid abuse		  a score of 4-7 indicates moderate risk for opioid abuse		  a score of 8 or higher indicates high risk for opioid abuse    REVIEW OF SYSTEMS:  CONSTITUTIONAL: No fever or fatigue  HEENT:  No difficulty hearing, no change in vision; + right eye blindness.   NECK: No pain or stiffness  RESPIRATORY: No cough, wheezing, chills or hemoptysis; No shortness of breath  CARDIOVASCULAR: No chest pain, palpitations, dizziness, or leg swelling  GASTROINTESTINAL: No loss of appetite, decreased PO intake. No abdominal or epigastric pain. No nausea, vomiting; No diarrhea or constipation.   GENITOURINARY: No dysuria, frequency, hematuria, retention or incontinence  MUSCULOSKELETAL: + left knee pain. + mild occasional right dorsal foot pain. No joint swelling; no upper or lower motor strength weakness, no saddle anesthesia, bowel/bladder incontinence, no falls   NEURO: No headaches, No numbness/tingling b/l LE, No weakness    PHYSICAL EXAM:  GENERAL:  Alert & Oriented X4, cooperative, NAD, Good concentration. Speech is clear.   RESPIRATORY: Respirations even and unlabored. Clear to auscultation bilaterally; No rales, rhonchi, wheezing, or rubs  CARDIOVASCULAR: Normal S1/S2, regular rate and rhythm; No murmurs, rubs, or gallops. No JVD.   GASTROINTESTINAL:  Soft, Nontender, Nondistended; Bowel sounds present  PERIPHERAL VASCULAR:  Extremities warm with left knee edema. 2+ Peripheral Pulses, No cyanosis, No calf tenderness  MUSCULOSKELETAL: Motor Strength 5/5 B/L upper and right lower extremities; + left knee immobilizer in place. + decreased left knee ROM; + left knee and right dorsal foot tenderness on palpation.   SKIN: Warm, dry, intact. No rashes, lesions, scars or wounds.     Risk factors associated with adverse outcomes related to opioid treatment  [ ]  Concurrent benzodiazepine use  [ ]  History/ Active substance use or alcohol use disorder  [ ] Psychiatric co-morbidity  [ ] Sleep apnea  [ ] COPD  [ ] BMI> 35  [ ] Liver dysfunction  [ ] Renal dysfunction  [ ] CHF  [ ] Smoker  [X ]  Age > 60 years    [X ]  [place]  Reviewed and Copied to Chart. See below.    Plan of care and goal oriented pain management treatment options were discussed with patient and /or primary care giver; all questions and concerns were addressed and care was aligned with patient's wishes.    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  Patient states she was walking and missed a step and she fell on her L knee.  She complains of left knee pain, right ankle plain.  She denies hitting her head.  She states that pain is so severe she is unable to ambulate. Patient's knee is mildly swollen on examination unable to bend the knee. X-ray.shows patellar fracture. Pt denies numbness or tingling, weakness, palpitation, CP, SOB, dizziness, HA. (07 Oct 2023 12:47)     Pt is admitted for left patellar fx. CT head- No evidence of acute trauma.Indeterminate ovoid densities within the right globe, possibly iatrogenic in etiology. Clinical correlation is recommended with further workup as warranted.1.3 cm hypodense left thyroid nodule. Recommend correlation with   nonemergent follow-up thyroid ultrasound.   Pain consulted for left knee pain on 10/7.  Pt seen and examined at bedside. Reports left knee pain score 7-8/10  SCALE USED: (1-10 VNRS). Pt describes pain as aching, throbbing, non- radiating, alleviated by pain medication, exacerbated by movement. Pt with left knee immobilizer. Also reports occasional mild right dorsal foot pain. Pt tolerating PO diet. Denies lethargy, chest pain, SOB, nausea, vomiting, constipation. Reports last BM 10/6. Patient stated goal for pain control: to be able to take deep breaths, get out of bed to chair and ambulate with tolerable pain control. Pt denies taking medications for pain at home.     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 (07 Oct 2023 12:47)   [X ] Denies ETOH use, illicit drug use and smoking    Allergies    No Known Allergies    MEDICATIONS  (STANDING):  acetaminophen     Tablet .. 1000 milliGRAM(s) Oral every 8 hours  aspirin enteric coated 81 milliGRAM(s) Oral daily  atorvastatin 80 milliGRAM(s) Oral at bedtime  chlorhexidine 2% Cloths 1 Application(s) Topical once  clopidogrel Tablet 75 milliGRAM(s) Oral daily  influenza   Vaccine 0.5 milliLiter(s) IntraMuscular once  insulin lispro (ADMELOG) corrective regimen sliding scale   SubCutaneous at bedtime  insulin lispro (ADMELOG) corrective regimen sliding scale   SubCutaneous three times a day before meals  lidocaine   4% Patch 2 Patch Transdermal daily  lisinopril 40 milliGRAM(s) Oral daily  metoprolol succinate  milliGRAM(s) Oral daily  polyethylene glycol 3350 17 Gram(s) Oral daily  povidone iodine 5% Nasal Swab 1 Application(s) Both Nostrils once  senna 2 Tablet(s) Oral at bedtime    MEDICATIONS  (PRN):  oxyCODONE    IR 5 milliGRAM(s) Oral every 4 hours PRN Severe Pain (7 - 10)      Vital Signs Last 24 Hrs  T(C): 36.2 (08 Oct 2023 14:17), Max: 36.9 (07 Oct 2023 15:37)  T(F): 97.2 (08 Oct 2023 14:17), Max: 98.4 (07 Oct 2023 15:37)  HR: 68 (08 Oct 2023 14:17) (68 - 85)  BP: 118/74 (08 Oct 2023 14:17) (118/74 - 145/81)  BP(mean): --  RR: 17 (08 Oct 2023 14:17) (17 - 18)  SpO2: 97% (08 Oct 2023 14:17) (96% - 99%)    Parameters below as of 08 Oct 2023 14:17  Patient On (Oxygen Delivery Method): room air        LABS: Reviewed.                          12.2   6.17  )-----------( 286      ( 08 Oct 2023 08:20 )             37.6     10-08    142  |  113<H>  |  24<H>  ----------------------------<  183<H>  3.9   |  23  |  1.05    Ca    8.2<L>      08 Oct 2023 08:20  Phos  3.4     10-08  Mg     2.3     10-08    TPro  6.1  /  Alb  2.8<L>  /  TBili  0.4  /  DBili  x   /  AST  15  /  ALT  27  /  AlkPhos  87  10-08      LIVER FUNCTIONS - ( 08 Oct 2023 08:20 )  Alb: 2.8 g/dL / Pro: 6.1 g/dL / ALK PHOS: 87 U/L / ALT: 27 U/L DA / AST: 15 U/L / GGT: x           Urinalysis Basic - ( 08 Oct 2023 08:20 )    Color: x / Appearance: x / SG: x / pH: x  Gluc: 183 mg/dL / Ketone: x  / Bili: x / Urobili: x   Blood: x / Protein: x / Nitrite: x   Leuk Esterase: x / RBC: x / WBC x   Sq Epi: x / Non Sq Epi: x / Bacteria: x      CAPILLARY BLOOD GLUCOSE      POCT Blood Glucose.: 118 mg/dL (08 Oct 2023 11:47)  POCT Blood Glucose.: 169 mg/dL (08 Oct 2023 08:17)  POCT Blood Glucose.: 226 mg/dL (07 Oct 2023 23:18)  POCT Blood Glucose.: 165 mg/dL (07 Oct 2023 17:13)        Radiology: Reviewed.   < from: CT Head No Cont (10.07.23 @ 03:01) >    ACC: [id number] EXAM:  CT BRAIN   ORDERED BY: ANASTACIA HURTADO     ACC: [id number] EXAM:  CT CERVICAL SPINE   ORDERED BY: ANASTACIA HURTADO     PROCEDURE DATE:  10/07/2023          INTERPRETATION:  Clinical Indication: Status post fall.    Comparison: None    Technique: Noncontrast axial CT images of the head and cervical spine   were obtained. Coronal and sagittal reconstructions were performed.    Head CT Findings:  The ventricles and sulci are normal in size for the patient's stated age.    No acute intracranial hemorrhage is identified.  There is no extra-axial   fluid collection. No mass effect or midline shift is seen.  There is no   evidence of acute territorial infarct.  There are periventricular and   subcortical white matter hypodensities, which are nonspecific, but likely   reflect mild microvascular ischemic disease.  There is bilateral maxillary sinus mucosal thickening with associated   near complete opacification on the right and partial desiccation left.   There is mild sphenoid and ethmoid sinus mucosal thickening. The mastoid   air cells are well aerated.  No acute osseous abnormality is seen.   Nonspecific high right 1.2 cm scalp nodule is noted.  Two ovoid densities are noted within the right globe, largest measuring   up to 1.3 cm, indeterminate, possibly iatrogenic in etiology.    Cervical Spine CT Findings:  Straightening of usual cervical lordosis is likely related to muscle   spasm or positioning.  There is no spondylolisthesis.  There is no acute   displaced fracture.  There is no prevertebral soft tissue swelling.  The   vertebral body heights are maintained. 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 There are multilevel degenerative changes   characterized by disc osteophyte complexesand facet and uncinate   hypertrophy. This results in mild canal stenosis and multilevel neural   foraminal narrowing. 1.3 cm hypodense left thyroid nodule.    Impression:  No evidence of acute trauma.    Indeterminate ovoid densities within the right globe, possibly iatrogenic   in etiology. Clinical correlation is recommended with further workup as   warranted.    1.3 cm hypodense left thyroid nodule. Recommend correlation with   nonemergent follow-up thyroid ultrasound.    --- End of Report ---            DAVID ESPITIA MD; Attending Radiologist  This document has been electronically signed. Oct  7 2023  4:16AM    < end of copied text >      ORT Score -   Family Hx of substance abuse	Female	      Male  Alcohol 	                                           1                     3  Illegal drugs	                                   2                     3  Rx drugs                                           4 	                  4  Personal Hx of substance abuse		  Alcohol 	                                          3	                  3  Illegal drugs                                     4	                  4  Rx drugs                                            5 	                  5  Age between 16- 45 years	           1                     1  hx preadolescent sexual abuse	   3 	                  0  Psychological disease		  ADD, OCD, bipolar, schizophrenia   2	          2  Depression                                           1 	          1  Total: 0    a score of 3 or lower indicates low risk for opioid abuse		  a score of 4-7 indicates moderate risk for opioid abuse		  a score of 8 or higher indicates high risk for opioid abuse    REVIEW OF SYSTEMS:  CONSTITUTIONAL: No fever or fatigue  HEENT:  No difficulty hearing, no change in vision; + right eye blindness.   NECK: No pain or stiffness  RESPIRATORY: No cough, wheezing, chills or hemoptysis; No shortness of breath  CARDIOVASCULAR: No chest pain, palpitations, dizziness, or leg swelling  GASTROINTESTINAL: No loss of appetite, decreased PO intake. No abdominal or epigastric pain. No nausea, vomiting; No diarrhea or constipation.   GENITOURINARY: No dysuria, frequency, hematuria, retention or incontinence  MUSCULOSKELETAL: + left knee pain. + mild occasional right dorsal foot pain. No joint swelling; no upper or lower motor strength weakness, no saddle anesthesia, bowel/bladder incontinence, no falls   NEURO: No headaches, No numbness/tingling b/l LE, No weakness    PHYSICAL EXAM:  GENERAL:  Alert & Oriented X4, cooperative, NAD, Good concentration. Speech is clear.   RESPIRATORY: Respirations even and unlabored. Clear to auscultation bilaterally; No rales, rhonchi, wheezing, or rubs  CARDIOVASCULAR: Normal S1/S2, regular rate and rhythm; No murmurs, rubs, or gallops. No JVD.   GASTROINTESTINAL:  Soft, Nontender, Nondistended; Bowel sounds present  PERIPHERAL VASCULAR:  Extremities warm with left knee edema. 2+ Peripheral Pulses, No cyanosis, No calf tenderness  MUSCULOSKELETAL: Motor Strength 5/5 B/L upper and right lower extremities; + left knee immobilizer in place. + decreased left knee ROM; + left knee and right dorsal foot tenderness on palpation.   SKIN: Warm, dry, intact. No rashes, lesions, scars or wounds.     Risk factors associated with adverse outcomes related to opioid treatment  [ ]  Concurrent benzodiazepine use  [ ]  History/ Active substance use or alcohol use disorder  [ ] Psychiatric co-morbidity  [ ] Sleep apnea  [ ] COPD  [ ] BMI> 35  [ ] Liver dysfunction  [ ] Renal dysfunction  [ ] CHF  [ ] Smoker  [X ]  Age > 60 years    [X ]  [place]  Reviewed and Copied to Chart. See below.    Plan of care and goal oriented pain management treatment options were discussed with patient and /or primary care giver; all questions and concerns were addressed and care was aligned with patient's wishes.    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  Patient states she was walking and missed a step and she fell on her L knee.  She complains of left knee pain, right ankle plain.  She denies hitting her head.  She states that pain is so severe she is unable to ambulate. Patient's knee is mildly swollen on examination unable to bend the knee. X-ray.shows patellar fracture. Pt denies numbness or tingling, weakness, palpitation, CP, SOB, dizziness, HA. (07 Oct 2023 12:47)     Pt is admitted for left patellar fx. CT head- No evidence of acute trauma.Indeterminate ovoid densities within the right globe, possibly iatrogenic in etiology. Clinical correlation is recommended with further workup as warranted.1.3 cm hypodense left thyroid nodule. Recommend correlation with   nonemergent follow-up thyroid ultrasound.   Pain consulted for left knee pain on 10/7.  Pt seen and examined at bedside. Reports left knee pain score 7-8/10  SCALE USED: (1-10 VNRS). Pt describes pain as aching, throbbing, non- radiating, alleviated by pain medication, exacerbated by movement. Pt with left knee immobilizer. Also reports occasional mild right dorsal foot pain. Pt tolerating PO diet. Denies lethargy, chest pain, SOB, nausea, vomiting, constipation. Reports last BM 10/6. Patient stated goal for pain control: to be able to take deep breaths, get out of bed to chair and ambulate with tolerable pain control. Pt denies taking medications for pain at home.     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 (07 Oct 2023 12:47)   [X ] Denies ETOH use, illicit drug use and smoking    Allergies    No Known Allergies    MEDICATIONS  (STANDING):  acetaminophen     Tablet .. 1000 milliGRAM(s) Oral every 8 hours  aspirin enteric coated 81 milliGRAM(s) Oral daily  atorvastatin 80 milliGRAM(s) Oral at bedtime  chlorhexidine 2% Cloths 1 Application(s) Topical once  clopidogrel Tablet 75 milliGRAM(s) Oral daily  influenza   Vaccine 0.5 milliLiter(s) IntraMuscular once  insulin lispro (ADMELOG) corrective regimen sliding scale   SubCutaneous at bedtime  insulin lispro (ADMELOG) corrective regimen sliding scale   SubCutaneous three times a day before meals  lidocaine   4% Patch 2 Patch Transdermal daily  lisinopril 40 milliGRAM(s) Oral daily  metoprolol succinate  milliGRAM(s) Oral daily  polyethylene glycol 3350 17 Gram(s) Oral daily  povidone iodine 5% Nasal Swab 1 Application(s) Both Nostrils once  senna 2 Tablet(s) Oral at bedtime    MEDICATIONS  (PRN):  oxyCODONE    IR 5 milliGRAM(s) Oral every 4 hours PRN Severe Pain (7 - 10)      Vital Signs Last 24 Hrs  T(C): 36.2 (08 Oct 2023 14:17), Max: 36.9 (07 Oct 2023 15:37)  T(F): 97.2 (08 Oct 2023 14:17), Max: 98.4 (07 Oct 2023 15:37)  HR: 68 (08 Oct 2023 14:17) (68 - 85)  BP: 118/74 (08 Oct 2023 14:17) (118/74 - 145/81)  BP(mean): --  RR: 17 (08 Oct 2023 14:17) (17 - 18)  SpO2: 97% (08 Oct 2023 14:17) (96% - 99%)    Parameters below as of 08 Oct 2023 14:17  Patient On (Oxygen Delivery Method): room air        LABS: Reviewed.                          12.2   6.17  )-----------( 286      ( 08 Oct 2023 08:20 )             37.6     10-08    142  |  113<H>  |  24<H>  ----------------------------<  183<H>  3.9   |  23  |  1.05    Ca    8.2<L>      08 Oct 2023 08:20  Phos  3.4     10-08  Mg     2.3     10-08    TPro  6.1  /  Alb  2.8<L>  /  TBili  0.4  /  DBili  x   /  AST  15  /  ALT  27  /  AlkPhos  87  10-08      LIVER FUNCTIONS - ( 08 Oct 2023 08:20 )  Alb: 2.8 g/dL / Pro: 6.1 g/dL / ALK PHOS: 87 U/L / ALT: 27 U/L DA / AST: 15 U/L / GGT: x           Urinalysis Basic - ( 08 Oct 2023 08:20 )    Color: x / Appearance: x / SG: x / pH: x  Gluc: 183 mg/dL / Ketone: x  / Bili: x / Urobili: x   Blood: x / Protein: x / Nitrite: x   Leuk Esterase: x / RBC: x / WBC x   Sq Epi: x / Non Sq Epi: x / Bacteria: x      CAPILLARY BLOOD GLUCOSE      POCT Blood Glucose.: 118 mg/dL (08 Oct 2023 11:47)  POCT Blood Glucose.: 169 mg/dL (08 Oct 2023 08:17)  POCT Blood Glucose.: 226 mg/dL (07 Oct 2023 23:18)  POCT Blood Glucose.: 165 mg/dL (07 Oct 2023 17:13)        Radiology: Reviewed.   < from: CT Head No Cont (10.07.23 @ 03:01) >    ACC: [id number] EXAM:  CT BRAIN   ORDERED BY: ANASTACIA HURTADO     ACC: [id number] EXAM:  CT CERVICAL SPINE   ORDERED BY: ANASTACIA HURTADO     PROCEDURE DATE:  10/07/2023          INTERPRETATION:  Clinical Indication: Status post fall.    Comparison: None    Technique: Noncontrast axial CT images of the head and cervical spine   were obtained. Coronal and sagittal reconstructions were performed.    Head CT Findings:  The ventricles and sulci are normal in size for the patient's stated age.    No acute intracranial hemorrhage is identified.  There is no extra-axial   fluid collection. No mass effect or midline shift is seen.  There is no   evidence of acute territorial infarct.  There are periventricular and   subcortical white matter hypodensities, which are nonspecific, but likely   reflect mild microvascular ischemic disease.  There is bilateral maxillary sinus mucosal thickening with associated   near complete opacification on the right and partial desiccation left.   There is mild sphenoid and ethmoid sinus mucosal thickening. The mastoid   air cells are well aerated.  No acute osseous abnormality is seen.   Nonspecific high right 1.2 cm scalp nodule is noted.  Two ovoid densities are noted within the right globe, largest measuring   up to 1.3 cm, indeterminate, possibly iatrogenic in etiology.    Cervical Spine CT Findings:  Straightening of usual cervical lordosis is likely related to muscle   spasm or positioning.  There is no spondylolisthesis.  There is no acute   displaced fracture.  There is no prevertebral soft tissue swelling.  The   vertebral body heights are maintained. 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 There are multilevel degenerative changes   characterized by disc osteophyte complexesand facet and uncinate   hypertrophy. This results in mild canal stenosis and multilevel neural   foraminal narrowing. 1.3 cm hypodense left thyroid nodule.    Impression:  No evidence of acute trauma.    Indeterminate ovoid densities within the right globe, possibly iatrogenic   in etiology. Clinical correlation is recommended with further workup as   warranted.    1.3 cm hypodense left thyroid nodule. Recommend correlation with   nonemergent follow-up thyroid ultrasound.    --- End of Report ---            DAVID ESPITIA MD; Attending Radiologist  This document has been electronically signed. Oct  7 2023  4:16AM    < end of copied text >      ORT Score -   Family Hx of substance abuse	Female	      Male  Alcohol 	                                           1                     3  Illegal drugs	                                   2                     3  Rx drugs                                           4 	                  4  Personal Hx of substance abuse		  Alcohol 	                                          3	                  3  Illegal drugs                                     4	                  4  Rx drugs                                            5 	                  5  Age between 16- 45 years	           1                     1  hx preadolescent sexual abuse	   3 	                  0  Psychological disease		  ADD, OCD, bipolar, schizophrenia   2	          2  Depression                                           1 	          1  Total: 0    a score of 3 or lower indicates low risk for opioid abuse		  a score of 4-7 indicates moderate risk for opioid abuse		  a score of 8 or higher indicates high risk for opioid abuse    REVIEW OF SYSTEMS:  CONSTITUTIONAL: No fever or fatigue  HEENT:  No difficulty hearing, no change in vision; + right eye blindness.   NECK: No pain or stiffness  RESPIRATORY: No cough, wheezing, chills or hemoptysis; No shortness of breath  CARDIOVASCULAR: No chest pain, palpitations, dizziness, or leg swelling  GASTROINTESTINAL: No loss of appetite, decreased PO intake. No abdominal or epigastric pain. No nausea, vomiting; No diarrhea or constipation.   GENITOURINARY: No dysuria, frequency, hematuria, retention or incontinence  MUSCULOSKELETAL: + left knee pain. + mild occasional right dorsal foot pain. No joint swelling; no upper or lower motor strength weakness, no saddle anesthesia, bowel/bladder incontinence, no falls   NEURO: No headaches, No numbness/tingling b/l LE, No weakness    PHYSICAL EXAM:  GENERAL:  Alert & Oriented X4, cooperative, NAD, Good concentration. Speech is clear.   RESPIRATORY: Respirations even and unlabored. Clear to auscultation bilaterally; No rales, rhonchi, wheezing, or rubs  CARDIOVASCULAR: Normal S1/S2, regular rate and rhythm; No murmurs, rubs, or gallops. No JVD.   GASTROINTESTINAL:  Soft, Nontender, Nondistended; Bowel sounds present  PERIPHERAL VASCULAR:  Extremities warm with left knee edema. 2+ Peripheral Pulses, No cyanosis, No calf tenderness  MUSCULOSKELETAL: Motor Strength 5/5 B/L upper and right lower extremities; + left knee immobilizer in place. + decreased left knee ROM; + left knee and right dorsal foot tenderness on palpation.   SKIN: Warm, dry, intact. No rashes, lesions, scars or wounds.     Risk factors associated with adverse outcomes related to opioid treatment  [ ]  Concurrent benzodiazepine use  [ ]  History/ Active substance use or alcohol use disorder  [ ] Psychiatric co-morbidity  [ ] Sleep apnea  [ ] COPD  [ ] BMI> 35  [ ] Liver dysfunction  [ ] Renal dysfunction  [ ] CHF  [ ] Smoker  [X ]  Age > 60 years    [X ]  [place]  Reviewed and Copied to Chart. See below.    Plan of care and goal oriented pain management treatment options were discussed with patient and /or primary care giver; all questions and concerns were addressed and care was aligned with patient's wishes.    Educated patient on goal oriented pain management treatment options

## 2023-10-08 NOTE — CONSULT NOTE ADULT - NS ATTEND AMEND GEN_ALL_CORE FT
I, José Chandra the attending physician, reviewed the above history and physical.  I have reviewed the appropriate medical and surgical history and reviewed the imaging independently as well performed an independent physical exam.  I have reviewed the above note and agree with the its findings and the plan as written with any exceptions noted below.     Patient with Left inferior pole patella fracture. Unable to perfrom straight leg raise, extensor mechanism disrupted. Recommend ORIF left patella. At this time patient would like to think about surgery    We discussed the risk and benefits of operative and nonoperative treatment options with the patient. Risks of surgery were discussed and include, but are not limited to, pain, infection, bleeding, nonunion, malunion, instability / dislocation, symptomatic hardware, limb length discrepancy, loss of motion, loss of strength, loss of function, need for revision surgery, damage to nerves and/or blood vessels, anesthetic risks, DVT, PE, MI, CVA, and even death. At this time patient is not ready for surgery will discuss again in the morning.    José Chandra MD  Attending Orthopedic Surgeon

## 2023-10-08 NOTE — PROGRESS NOTE ADULT - SUBJECTIVE AND OBJECTIVE BOX
S: Patient seen and examined at bedside. No acute events overnight. Resting comfortably, states pain is improved.     REVIEW OF SYSTEMS:  CONSTITUTIONAL: No weakness, fevers or chills  EYES: No visual changes  ENT: No vertigo or throat pain   NECK: No pain or stiffness  RESPIRATORY: No cough, wheezing, hemoptysis; No shortness of breath  CARDIOVASCULAR: No chest pain or palpitations  GASTROINTESTINAL: No abdominal or epigastric pain. No nausea, vomiting, or hematemesis; No diarrhea or constipation. No melena or hematochezia.  GENITOURINARY: No dysuria, frequency or hematuria  NEUROLOGICAL: No numbness or weakness  SKIN: No itching, rashes  PSYCH: No depression, anxiety    O:  Vital Signs Last 24 Hrs  T(C): 36.6 (08 Oct 2023 05:20), Max: 36.9 (07 Oct 2023 15:37)  T(F): 97.8 (08 Oct 2023 05:20), Max: 98.4 (07 Oct 2023 15:37)  HR: 78 (08 Oct 2023 05:20) (78 - 85)  BP: 136/80 (08 Oct 2023 05:20) (136/80 - 145/81)  BP(mean): --  RR: 18 (08 Oct 2023 05:20) (18 - 18)  SpO2: 96% (08 Oct 2023 05:20) (96% - 99%)    Parameters below as of 08 Oct 2023 05:20  Patient On (Oxygen Delivery Method): room air    Constitutional/General: Well developed, vitals reviewed  EYE: Symmetrical pupils, conjunctiva clear   ENT: Good dentition, oropharynx clear  NECK: No visual masses, no JVD  CHEST: No respiratory distress, bilateral symmetrical chest rise  ABDOMEN: Nondistended, no visual masses  SKIN: No rash, warm, dry  MSK: L-knee in immobilizer  NEURO: A+Ox3, Cranial nerves grossly intact, moves all extremities, follows commands  PSYCH: Normal mood, normal affect    acetaminophen     Tablet .. 1000 milliGRAM(s) Oral every 8 hours  aspirin enteric coated 81 milliGRAM(s) Oral daily  atorvastatin 80 milliGRAM(s) Oral at bedtime  clopidogrel Tablet 75 milliGRAM(s) Oral daily  enoxaparin Injectable 40 milliGRAM(s) SubCutaneous every 24 hours  influenza   Vaccine 0.5 milliLiter(s) IntraMuscular once  insulin lispro (ADMELOG) corrective regimen sliding scale   SubCutaneous at bedtime  insulin lispro (ADMELOG) corrective regimen sliding scale   SubCutaneous three times a day before meals  lidocaine   4% Patch 2 Patch Transdermal daily  lisinopril 40 milliGRAM(s) Oral daily  metoprolol succinate  milliGRAM(s) Oral daily  oxyCODONE    IR 5 milliGRAM(s) Oral every 4 hours PRN  polyethylene glycol 3350 17 Gram(s) Oral daily  senna 2 Tablet(s) Oral at bedtime                            12.2   6.17  )-----------( 286      ( 08 Oct 2023 08:20 )             37.6       10-08    142  |  113<H>  |  24<H>  ----------------------------<  183<H>  3.9   |  23  |  1.05    Ca    8.2<L>      08 Oct 2023 08:20  Phos  3.4     10-08  Mg     2.3     10-08    TPro  6.1  /  Alb  2.8<L>  /  TBili  0.4  /  DBili  x   /  AST  15  /  ALT  27  /  AlkPhos  87  10-08

## 2023-10-08 NOTE — PROGRESS NOTE ADULT - ASSESSMENT
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   Patient seen at bedside. States she fell on her knee while at an award ceremony after missing a step. Denies any head trauma or LOC. Patient in immobilizer at time of evaluation, limited ROM. Motor strength in RLE wnl. Patient states pain in the LLE is 7/10, radiates from the knee down to her foot. States she is an active smoker, and smokes 3/4 pack per day. She is not interested in a nicotine patch at this time as she states she can "just pull it off" whenever she wants a cigarette.     Social Hx: current smoker, 40 pack year history, denies alcohol or recreational drug use    PE: as above; decreased active and passive ROM of left knee   Labs and imaging reviewed by me: CBC, BMP, XR    Plan:   -L-knee xray showing patellar fracture  -Pain control: Tylenol, Percocet, Lidocaine patch  -Ortho consulted with recommendation for immobilizer, official note pending   -ACHS FS, ISS; BG within appropriate range  -Hold home diabetes meds  -Continue meds for chronic conditions: HTN, HLD, CAD  -Counseled on smoking cessation, patient interested in quitting, states will follow up with her PCP upon discharge  -Pain management consult   -PT eval   -OP referral for US thyroid for incidental finding on CT C-spine; TSH wnl  -DVT ppx #Intractable pain 2/2 L-patellar fracture   #Pre-op clearance  #Thyroid nodule  #Type 2 DM   #HTN   #HLD   #CAD s/p PCI   #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      -L-knee xray showing patellar fracture  -Planned for ORIF of left patella on 10/09; NPO at midnight, hold DVT ppx  -Pain control: Tylenol, Percocet, Lidocaine patch  -Ortho consulted with recommendation for immobilizer, official note pending   -ACHS FS, ISS; BG within appropriate range  -Hold home diabetes meds  -Continue meds for chronic conditions: HTN, HLD, CAD  -Counseled on smoking cessation, patient interested in quitting, states will follow up with her PCP upon discharge  -Pain management consult   -PT eval   -OP referral for US thyroid for incidental finding on CT C-spine; TSH wnl  -Pre-op clearance: obtain EKG; RCRI Class II risk, 6.0% 30-day risk of death/cardiac arrest; Jennings score 0.1% risk of MI/cardiac arrest, patient is low risk for an intermediate risk procedure

## 2023-10-09 ENCOUNTER — TRANSCRIPTION ENCOUNTER (OUTPATIENT)
Age: 63
End: 2023-10-09

## 2023-10-09 LAB
ABO RH CONFIRMATION: SIGNIFICANT CHANGE UP
ALBUMIN SERPL ELPH-MCNC: 3.2 G/DL — LOW (ref 3.5–5)
ALP SERPL-CCNC: 107 U/L — SIGNIFICANT CHANGE UP (ref 40–120)
ALT FLD-CCNC: 26 U/L DA — SIGNIFICANT CHANGE UP (ref 10–60)
ANION GAP SERPL CALC-SCNC: 0 MMOL/L — LOW (ref 5–17)
APTT BLD: 31.8 SEC — SIGNIFICANT CHANGE UP (ref 24.5–35.6)
AST SERPL-CCNC: 15 U/L — SIGNIFICANT CHANGE UP (ref 10–40)
BASOPHILS # BLD AUTO: 0.06 K/UL — SIGNIFICANT CHANGE UP (ref 0–0.2)
BASOPHILS NFR BLD AUTO: 0.9 % — SIGNIFICANT CHANGE UP (ref 0–2)
BILIRUB SERPL-MCNC: 0.4 MG/DL — SIGNIFICANT CHANGE UP (ref 0.2–1.2)
BUN SERPL-MCNC: 23 MG/DL — HIGH (ref 7–18)
CALCIUM SERPL-MCNC: 8.9 MG/DL — SIGNIFICANT CHANGE UP (ref 8.4–10.5)
CHLORIDE SERPL-SCNC: 112 MMOL/L — HIGH (ref 96–108)
CO2 SERPL-SCNC: 27 MMOL/L — SIGNIFICANT CHANGE UP (ref 22–31)
CREAT SERPL-MCNC: 1.2 MG/DL — SIGNIFICANT CHANGE UP (ref 0.5–1.3)
EGFR: 52 ML/MIN/1.73M2 — LOW
EOSINOPHIL # BLD AUTO: 0.14 K/UL — SIGNIFICANT CHANGE UP (ref 0–0.5)
EOSINOPHIL NFR BLD AUTO: 2.1 % — SIGNIFICANT CHANGE UP (ref 0–6)
GLUCOSE BLDC GLUCOMTR-MCNC: 141 MG/DL — HIGH (ref 70–99)
GLUCOSE BLDC GLUCOMTR-MCNC: 142 MG/DL — HIGH (ref 70–99)
GLUCOSE BLDC GLUCOMTR-MCNC: 145 MG/DL — HIGH (ref 70–99)
GLUCOSE BLDC GLUCOMTR-MCNC: 145 MG/DL — HIGH (ref 70–99)
GLUCOSE BLDC GLUCOMTR-MCNC: 191 MG/DL — HIGH (ref 70–99)
GLUCOSE SERPL-MCNC: 138 MG/DL — HIGH (ref 70–99)
HCT VFR BLD CALC: 41.6 % — SIGNIFICANT CHANGE UP (ref 34.5–45)
HGB BLD-MCNC: 13.2 G/DL — SIGNIFICANT CHANGE UP (ref 11.5–15.5)
IMM GRANULOCYTES NFR BLD AUTO: 0.5 % — SIGNIFICANT CHANGE UP (ref 0–0.9)
INR BLD: 0.9 RATIO — SIGNIFICANT CHANGE UP (ref 0.85–1.18)
LYMPHOCYTES # BLD AUTO: 2.52 K/UL — SIGNIFICANT CHANGE UP (ref 1–3.3)
LYMPHOCYTES # BLD AUTO: 38.1 % — SIGNIFICANT CHANGE UP (ref 13–44)
MAGNESIUM SERPL-MCNC: 2.5 MG/DL — SIGNIFICANT CHANGE UP (ref 1.6–2.6)
MCHC RBC-ENTMCNC: 28.1 PG — SIGNIFICANT CHANGE UP (ref 27–34)
MCHC RBC-ENTMCNC: 31.7 GM/DL — LOW (ref 32–36)
MCV RBC AUTO: 88.7 FL — SIGNIFICANT CHANGE UP (ref 80–100)
MONOCYTES # BLD AUTO: 0.56 K/UL — SIGNIFICANT CHANGE UP (ref 0–0.9)
MONOCYTES NFR BLD AUTO: 8.5 % — SIGNIFICANT CHANGE UP (ref 2–14)
NEUTROPHILS # BLD AUTO: 3.3 K/UL — SIGNIFICANT CHANGE UP (ref 1.8–7.4)
NEUTROPHILS NFR BLD AUTO: 49.9 % — SIGNIFICANT CHANGE UP (ref 43–77)
NRBC # BLD: 0 /100 WBCS — SIGNIFICANT CHANGE UP (ref 0–0)
PHOSPHATE SERPL-MCNC: 3.1 MG/DL — SIGNIFICANT CHANGE UP (ref 2.5–4.5)
PLATELET # BLD AUTO: 302 K/UL — SIGNIFICANT CHANGE UP (ref 150–400)
POTASSIUM SERPL-MCNC: 4.4 MMOL/L — SIGNIFICANT CHANGE UP (ref 3.5–5.3)
POTASSIUM SERPL-SCNC: 4.4 MMOL/L — SIGNIFICANT CHANGE UP (ref 3.5–5.3)
PROT SERPL-MCNC: 7 G/DL — SIGNIFICANT CHANGE UP (ref 6–8.3)
PROTHROM AB SERPL-ACNC: 10.3 SEC — SIGNIFICANT CHANGE UP (ref 9.5–13)
RBC # BLD: 4.69 M/UL — SIGNIFICANT CHANGE UP (ref 3.8–5.2)
RBC # FLD: 14.4 % — SIGNIFICANT CHANGE UP (ref 10.3–14.5)
SODIUM SERPL-SCNC: 139 MMOL/L — SIGNIFICANT CHANGE UP (ref 135–145)
WBC # BLD: 6.61 K/UL — SIGNIFICANT CHANGE UP (ref 3.8–10.5)
WBC # FLD AUTO: 6.61 K/UL — SIGNIFICANT CHANGE UP (ref 3.8–10.5)

## 2023-10-09 PROCEDURE — 27524 TREAT KNEECAP FRACTURE: CPT | Mod: AS,LT

## 2023-10-09 PROCEDURE — 27524 TREAT KNEECAP FRACTURE: CPT | Mod: LT

## 2023-10-09 PROCEDURE — 99232 SBSQ HOSP IP/OBS MODERATE 35: CPT | Mod: GC

## 2023-10-09 DEVICE — IMPLANTABLE DEVICE: Type: IMPLANTABLE DEVICE | Site: LEFT | Status: FUNCTIONAL

## 2023-10-09 RX ORDER — FENTANYL CITRATE 50 UG/ML
50 INJECTION INTRAVENOUS
Refills: 0 | Status: DISCONTINUED | OUTPATIENT
Start: 2023-10-09 | End: 2023-10-09

## 2023-10-09 RX ORDER — INSULIN LISPRO 100/ML
VIAL (ML) SUBCUTANEOUS AT BEDTIME
Refills: 0 | Status: DISCONTINUED | OUTPATIENT
Start: 2023-10-09 | End: 2023-10-10

## 2023-10-09 RX ORDER — CEFAZOLIN SODIUM 1 G
1000 VIAL (EA) INJECTION EVERY 8 HOURS
Refills: 0 | Status: COMPLETED | OUTPATIENT
Start: 2023-10-09 | End: 2023-10-10

## 2023-10-09 RX ORDER — ONDANSETRON 8 MG/1
4 TABLET, FILM COATED ORAL ONCE
Refills: 0 | Status: DISCONTINUED | OUTPATIENT
Start: 2023-10-09 | End: 2023-10-09

## 2023-10-09 RX ORDER — ONDANSETRON 8 MG/1
4 TABLET, FILM COATED ORAL EVERY 6 HOURS
Refills: 0 | Status: DISCONTINUED | OUTPATIENT
Start: 2023-10-09 | End: 2023-10-10

## 2023-10-09 RX ORDER — CLOPIDOGREL BISULFATE 75 MG/1
75 TABLET, FILM COATED ORAL DAILY
Refills: 0 | Status: DISCONTINUED | OUTPATIENT
Start: 2023-10-10 | End: 2023-10-10

## 2023-10-09 RX ORDER — LISINOPRIL 2.5 MG/1
40 TABLET ORAL DAILY
Refills: 0 | Status: DISCONTINUED | OUTPATIENT
Start: 2023-10-09 | End: 2023-10-10

## 2023-10-09 RX ORDER — ASPIRIN/CALCIUM CARB/MAGNESIUM 324 MG
81 TABLET ORAL DAILY
Refills: 0 | Status: DISCONTINUED | OUTPATIENT
Start: 2023-10-10 | End: 2023-10-10

## 2023-10-09 RX ORDER — TRAMADOL HYDROCHLORIDE 50 MG/1
50 TABLET ORAL EVERY 8 HOURS
Refills: 0 | Status: DISCONTINUED | OUTPATIENT
Start: 2023-10-09 | End: 2023-10-10

## 2023-10-09 RX ORDER — OXYCODONE HYDROCHLORIDE 5 MG/1
5 TABLET ORAL EVERY 4 HOURS
Refills: 0 | Status: DISCONTINUED | OUTPATIENT
Start: 2023-10-09 | End: 2023-10-10

## 2023-10-09 RX ORDER — ATORVASTATIN CALCIUM 80 MG/1
80 TABLET, FILM COATED ORAL AT BEDTIME
Refills: 0 | Status: DISCONTINUED | OUTPATIENT
Start: 2023-10-09 | End: 2023-10-10

## 2023-10-09 RX ORDER — SODIUM CHLORIDE 9 MG/ML
1000 INJECTION, SOLUTION INTRAVENOUS
Refills: 0 | Status: DISCONTINUED | OUTPATIENT
Start: 2023-10-09 | End: 2023-10-09

## 2023-10-09 RX ORDER — ENOXAPARIN SODIUM 100 MG/ML
40 INJECTION SUBCUTANEOUS EVERY 24 HOURS
Refills: 0 | Status: DISCONTINUED | OUTPATIENT
Start: 2023-10-10 | End: 2023-10-10

## 2023-10-09 RX ORDER — SENNA PLUS 8.6 MG/1
2 TABLET ORAL AT BEDTIME
Refills: 0 | Status: DISCONTINUED | OUTPATIENT
Start: 2023-10-09 | End: 2023-10-10

## 2023-10-09 RX ORDER — INSULIN LISPRO 100/ML
VIAL (ML) SUBCUTANEOUS
Refills: 0 | Status: DISCONTINUED | OUTPATIENT
Start: 2023-10-09 | End: 2023-10-10

## 2023-10-09 RX ORDER — FENTANYL CITRATE 50 UG/ML
25 INJECTION INTRAVENOUS
Refills: 0 | Status: DISCONTINUED | OUTPATIENT
Start: 2023-10-09 | End: 2023-10-09

## 2023-10-09 RX ORDER — METOPROLOL TARTRATE 50 MG
100 TABLET ORAL DAILY
Refills: 0 | Status: DISCONTINUED | OUTPATIENT
Start: 2023-10-09 | End: 2023-10-10

## 2023-10-09 RX ADMIN — ATORVASTATIN CALCIUM 80 MILLIGRAM(S): 80 TABLET, FILM COATED ORAL at 21:01

## 2023-10-09 RX ADMIN — OXYCODONE HYDROCHLORIDE 5 MILLIGRAM(S): 5 TABLET ORAL at 03:22

## 2023-10-09 RX ADMIN — LIDOCAINE 2 PATCH: 4 CREAM TOPICAL at 00:30

## 2023-10-09 RX ADMIN — SENNA PLUS 2 TABLET(S): 8.6 TABLET ORAL at 21:01

## 2023-10-09 RX ADMIN — OXYCODONE HYDROCHLORIDE 5 MILLIGRAM(S): 5 TABLET ORAL at 19:03

## 2023-10-09 RX ADMIN — LISINOPRIL 40 MILLIGRAM(S): 2.5 TABLET ORAL at 05:51

## 2023-10-09 RX ADMIN — OXYCODONE HYDROCHLORIDE 5 MILLIGRAM(S): 5 TABLET ORAL at 04:22

## 2023-10-09 RX ADMIN — Medication 1 APPLICATION(S): at 07:34

## 2023-10-09 RX ADMIN — OXYCODONE HYDROCHLORIDE 5 MILLIGRAM(S): 5 TABLET ORAL at 08:30

## 2023-10-09 RX ADMIN — OXYCODONE HYDROCHLORIDE 5 MILLIGRAM(S): 5 TABLET ORAL at 07:43

## 2023-10-09 RX ADMIN — TRAMADOL HYDROCHLORIDE 50 MILLIGRAM(S): 50 TABLET ORAL at 21:02

## 2023-10-09 RX ADMIN — TRAMADOL HYDROCHLORIDE 50 MILLIGRAM(S): 50 TABLET ORAL at 22:00

## 2023-10-09 RX ADMIN — Medication 100 MILLIGRAM(S): at 05:51

## 2023-10-09 RX ADMIN — CHLORHEXIDINE GLUCONATE 1 APPLICATION(S): 213 SOLUTION TOPICAL at 05:51

## 2023-10-09 RX ADMIN — OXYCODONE HYDROCHLORIDE 5 MILLIGRAM(S): 5 TABLET ORAL at 18:36

## 2023-10-09 RX ADMIN — Medication 100 MILLIGRAM(S): at 21:03

## 2023-10-09 NOTE — PROGRESS NOTE ADULT - PROBLEM SELECTOR PLAN 1
p/w  L knee pain s/p fall  denies hitting her head  CTH, cervical neg  X-ray.shows patellar fracture  s/p lidocaine patch, tylenol, and toradol in ED  orthopedics consulted and recommended knee immobilizer and crutches  f/u PT eval  ambulate with assistant  tylenol for mild pain  percocet for mod pain  fall risk precautions  Consult pain management in AM p/w  L knee pain s/p fall  denies hitting her head  CTH, cervical neg  X-ray.shows patellar fracture  s/p lidocaine patch, tylenol, and toradol in ED  orthopedics consulted and recommended knee immobilizer and crutches  f/u PT eval  ambulate with assistant  tylenol for mild pain  percocet for mod pain  fall risk precautions  OR on 10/09

## 2023-10-09 NOTE — PROGRESS NOTE ADULT - ATTENDING COMMENTS
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 Lives with Mother who is 81 y/o; Pain in Left leg controlled; She feels her throat is dry being NPO since last night. Denies chest pain, SOB, nausea, vomit or abdominal pain    Vital Signs Last 24 Hrs  T(C): 36.3 (09 Oct 2023 17:52), Max: 36.8 (09 Oct 2023 13:53)  T(F): 97.4 (09 Oct 2023 17:52), Max: 98.2 (09 Oct 2023 13:53)  HR: 71 (09 Oct 2023 17:52) (66 - 85)  BP: 162/82 (09 Oct 2023 17:52) (127/69 - 169/87)  BP(mean): 88 (09 Oct 2023 16:45) (88 - 124)  RR: 18 (09 Oct 2023 17:52) (12 - 18)  SpO2: 94% (09 Oct 2023 17:52) (94% - 100%): room air    P/E:  Psych: AAO x3  Neuro: No gross focal deficits; Power and sensation intact  CVS: S1S2 present, regular, no edema  Resp: BLAE+, No wheeze or Rhonchi  GI: Soft, BS+, Non tender, non distended  Extr: No  calf tenderness B/L Lower extremities  Skin: Warm and moist without any rashes    Labs:                        13.2   6.61  )-----------( 302      ( 09 Oct 2023 07:41 )             41.6   10-09    139  |  112<H>  |  23<H>  ----------------------------<  138<H>  4.4   |  27  |  1.20  Ca    8.9      09 Oct 2023 07:41  Phos  3.1     10-09  Mg     2.5     10-09    TPro  7.0  /  Alb  3.2<L>  /  TBili  0.4  /  DBili  x   /  AST  15  /  ALT  26  /  AlkPhos  107  10-09    Xray Knee 3 Views, Left (10.07.23 @ 01:15)     IMPRESSION: Slightly displaced lower horizontal fracture left patella.    A/P:  Left Patellar Fracture s/p ORIF POD# 0  Acute Fracture pain  HTN slightly elevated BP due to pain  Thyroid nodule  Hx CAD s/p stent  Tpe 2 DM   Hx Right eye retinal detachment    Plan:  Pain control  Bowel regimen  Resume Aspirin and Plavix in AM if okay with Ortho   DVT ppx Patient seen and examined this evening on returning form OR after operative fixation of patellar fracture    Patient ambulates with no assistant at baseline with PMH of DM, HTN, HLD, MI (2015 on Plavix s/p stenting), R eye retinal detachment (blind on R eye) presented with L knee pain s/p fall while receiving an award for [employer] service missing a step    Patient s/p Sx doing okay; drowsy but easily arousable. Lives with Mother who is 81 y/o; Pain in Left leg controlled; She feels her throat is dry being NPO since last night. Denies chest pain, SOB, nausea, vomit or abdominal pain    Vital Signs Last 24 Hrs  T(C): 36.3 (09 Oct 2023 17:52), Max: 36.8 (09 Oct 2023 13:53)  T(F): 97.4 (09 Oct 2023 17:52), Max: 98.2 (09 Oct 2023 13:53)  HR: 71 (09 Oct 2023 17:52) (66 - 85)  BP: 162/82 (09 Oct 2023 17:52) (127/69 - 169/87)  BP(mean): 88 (09 Oct 2023 16:45) (88 - 124)  RR: 18 (09 Oct 2023 17:52) (12 - 18)  SpO2: 94% (09 Oct 2023 17:52) (94% - 100%): room air    P/E:  Psych: AAO x3  Neuro: No gross focal deficits; Power and sensation intact  CVS: S1S2 present, regular, no edema  Resp: BLAE+, No wheeze or Rhonchi  GI: Soft, BS+, Non tender, non distended  Extr: No  calf tenderness B/L Lower extremities  Skin: Warm and moist without any rashes    Labs:                        13.2   6.61  )-----------( 302      ( 09 Oct 2023 07:41 )             41.6   10-09    139  |  112<H>  |  23<H>  ----------------------------<  138<H>  4.4   |  27  |  1.20  Ca    8.9      09 Oct 2023 07:41  Phos  3.1     10-09  Mg     2.5     10-09    TPro  7.0  /  Alb  3.2<L>  /  TBili  0.4  /  DBili  x   /  AST  15  /  ALT  26  /  AlkPhos  107  10-09    Xray Knee 3 Views, Left (10.07.23 @ 01:15)     IMPRESSION: Slightly displaced lower horizontal fracture left patella.    A/P:  Left Patellar Fracture s/p ORIF POD# 0  Acute Fracture pain  HTN slightly elevated BP due to pain  Thyroid nodule  Hx CAD s/p stent  Tpe 2 DM   Hx Right eye retinal detachment    Plan:  Pain control  Bowel regimen  Resume Aspirin and Plavix in AM if okay with Ortho   DVT ppx Patient seen and examined this evening on returning form OR after operative fixation of patellar fracture    Patient ambulates with no assistant at baseline with PMH of DM, HTN, HLD, MI (2015 on Plavix s/p stenting), R eye retinal detachment (blind on R eye) presented with L knee pain s/p fall while receiving an award for [employer] service missing a step    Patient s/p Sx doing okay; drowsy but easily arousable. Lives with Mother who is 79 y/o; Pain in Left leg controlled; She feels her throat is dry being NPO since last night. Denies chest pain, SOB, nausea, vomit or abdominal pain    Vital Signs Last 24 Hrs  T(C): 36.3 (09 Oct 2023 17:52), Max: 36.8 (09 Oct 2023 13:53)  T(F): 97.4 (09 Oct 2023 17:52), Max: 98.2 (09 Oct 2023 13:53)  HR: 71 (09 Oct 2023 17:52) (66 - 85)  BP: 162/82 (09 Oct 2023 17:52) (127/69 - 169/87)  BP(mean): 88 (09 Oct 2023 16:45) (88 - 124)  RR: 18 (09 Oct 2023 17:52) (12 - 18)  SpO2: 94% (09 Oct 2023 17:52) (94% - 100%): room air    P/E:  Psych: AAO x3  Neuro: No gross focal deficits; Power and sensation intact  CVS: S1S2 present, regular, no edema  Resp: BLAE+, No wheeze or Rhonchi  GI: Soft, BS+, Non tender, non distended  Extr: No  calf tenderness B/L Lower extremities  Skin: Warm and moist without any rashes    Labs:                        13.2   6.61  )-----------( 302      ( 09 Oct 2023 07:41 )             41.6   10-09    139  |  112<H>  |  23<H>  ----------------------------<  138<H>  4.4   |  27  |  1.20  Ca    8.9      09 Oct 2023 07:41  Phos  3.1     10-09  Mg     2.5     10-09    TPro  7.0  /  Alb  3.2<L>  /  TBili  0.4  /  DBili  x   /  AST  15  /  ALT  26  /  AlkPhos  107  10-09    Xray Knee 3 Views, Left (10.07.23 @ 01:15)     IMPRESSION: Slightly displaced lower horizontal fracture left patella.    A/P:  Left Patellar Fracture s/p ORIF POD# 0  Acute Fracture pain  HTN slightly elevated BP due to pain  Thyroid nodule  Hx CAD s/p stent  Tpe 2 DM   Hx Right eye retinal detachment    Plan:  Pain control  Bowel regimen  Resume Aspirin and Plavix in AM if okay with Ortho   DVT ppx Patient seen and examined this evening on returning form OR after operative fixation of patellar fracture    Patient ambulates with no assistant at baseline with PMH of DM, HTN, HLD, MI (2015 on Plavix s/p stenting), R eye retinal detachment (blind on R eye) presented with L knee pain s/p fall while receiving an award for [employer] service missing a step    Patient s/p TY doing okay; drowsy but easily arousable. Lives with Mother who is 79 y/o; Pain in Left leg controlled; She feels her throat is dry being NPO since last night. Denies chest pain, SOB, nausea, vomit or abdominal pain    Vital Signs Last 24 Hrs  T(C): 36.3 (09 Oct 2023 17:52), Max: 36.8 (09 Oct 2023 13:53)  T(F): 97.4 (09 Oct 2023 17:52), Max: 98.2 (09 Oct 2023 13:53)  HR: 71 (09 Oct 2023 17:52) (66 - 85)  BP: 162/82 (09 Oct 2023 17:52) (127/69 - 169/87)  BP(mean): 88 (09 Oct 2023 16:45) (88 - 124)  RR: 18 (09 Oct 2023 17:52) (12 - 18)  SpO2: 94% (09 Oct 2023 17:52) (94% - 100%): room air    P/E:  Psych: AAO x3  Neuro: No gross focal deficits; Power and sensation intact  CVS: S1S2 present, regular, no edema  Resp: BLAE+, No wheeze or Rhonchi  GI: Soft, BS+, Non tender, non distended  Extr: No  calf tenderness or edema; Left leg immobilizer in place  Skin: Warm and moist without any rashes    Labs:                      13.2   6.61  )-----------( 302      ( 09 Oct 2023 07:41 )             41.6   10-09    139  |  112<H>  |  23<H>  ----------------------------<  138<H>  4.4   |  27  |  1.20  Ca    8.9      09 Oct 2023 07:41  Phos  3.1     10-09  Mg     2.5     10-09  TPro  7.0  /  Alb  3.2<L>  /  TBili  0.4  /  DBili  x   /  AST  15  /  ALT  26  /  AlkPhos  107  10-09    Xray Knee 3 Views, Left (10.07.23 @ 01:15)   IMPRESSION: Slightly displaced lower horizontal fracture left patella.    A/P:  Left Patellar Fracture s/p ORIF POD# 0  Acute Fracture pain  HTN slightly elevated BP likely due to pain  Thyroid nodule  Hx CAD s/p stent  Tpe 2 DM   Hx Right eye retinal detachment    Plan:  Patient tolerated ORIF well  Pain control  Bowel regimen  Ortho f/u; d/w TOSHA Mulligan this evening; can continue on ASA for DVT ppx as being d/c home; able to ambulate' d/w pt  Resume Aspirin and Plavix in AM if okay with Ortho; Patient advised to take ASA/ Plavix with meals  DVT ppx while Inhouse  PT eval and D/C plan; lives with Mom  Follow up outpatient Endocrine for thyroid nodule f/u  Oral hypoglycemics on hold; ISS while inhouse;     Discussed with Patient findings and plan of care  I will be away 10/10/23 onwards; Hospitalist Colleague to assume care AM Patient seen and examined this evening on returning form OR after operative fixation of patellar fracture    Patient ambulates with no assistant at baseline with PMH of DM, HTN, HLD, MI (2015 on Plavix s/p stenting), R eye retinal detachment (blind on R eye) presented with L knee pain s/p fall while receiving an award for [employer] service missing a step    Patient s/p TY doing okay; drowsy but easily arousable. Lives with Mother who is 81 y/o; Pain in Left leg controlled; She feels her throat is dry being NPO since last night. Denies chest pain, SOB, nausea, vomit or abdominal pain    Vital Signs Last 24 Hrs  T(C): 36.3 (09 Oct 2023 17:52), Max: 36.8 (09 Oct 2023 13:53)  T(F): 97.4 (09 Oct 2023 17:52), Max: 98.2 (09 Oct 2023 13:53)  HR: 71 (09 Oct 2023 17:52) (66 - 85)  BP: 162/82 (09 Oct 2023 17:52) (127/69 - 169/87)  BP(mean): 88 (09 Oct 2023 16:45) (88 - 124)  RR: 18 (09 Oct 2023 17:52) (12 - 18)  SpO2: 94% (09 Oct 2023 17:52) (94% - 100%): room air    P/E:  Psych: AAO x3  Neuro: No gross focal deficits; Power and sensation intact  CVS: S1S2 present, regular, no edema  Resp: BLAE+, No wheeze or Rhonchi  GI: Soft, BS+, Non tender, non distended  Extr: No  calf tenderness or edema; Left leg immobilizer in place  Skin: Warm and moist without any rashes    Labs:                      13.2   6.61  )-----------( 302      ( 09 Oct 2023 07:41 )             41.6   10-09    139  |  112<H>  |  23<H>  ----------------------------<  138<H>  4.4   |  27  |  1.20  Ca    8.9      09 Oct 2023 07:41  Phos  3.1     10-09  Mg     2.5     10-09  TPro  7.0  /  Alb  3.2<L>  /  TBili  0.4  /  DBili  x   /  AST  15  /  ALT  26  /  AlkPhos  107  10-09    Xray Knee 3 Views, Left (10.07.23 @ 01:15)   IMPRESSION: Slightly displaced lower horizontal fracture left patella.    A/P:  Left Patellar Fracture s/p ORIF POD# 0  Acute Fracture pain  HTN slightly elevated BP likely due to pain  Thyroid nodule  Hx CAD s/p stent  Tpe 2 DM   Hx Right eye retinal detachment    Plan:  Patient tolerated ORIF well  Pain control  Bowel regimen  Ortho f/u; d/w TOSHA Mulligan this evening; can continue on ASA for DVT ppx as being d/c home; able to ambulate' d/w pt  Resume Aspirin and Plavix in AM if okay with Ortho; Patient advised to take ASA/ Plavix with meals  DVT ppx while Inhouse  PT eval and D/C plan; lives with Mom  Follow up outpatient Endocrine for thyroid nodule f/u  Oral hypoglycemics on hold; ISS while inhouse;     Discussed with Patient findings and plan of care  I will be away 10/10/23 onwards; Hospitalist Colleague to assume care AM Patient seen and examined this evening on returning form OR after operative fixation of patellar fracture    Patient ambulates with no assistant at baseline with PMH of DM, HTN, HLD, MI (2015 on Plavix s/p stenting), R eye retinal detachment (blind on R eye) presented with L knee pain s/p fall while receiving an award for [employer] service missing a step    Patient s/p TY doing okay; drowsy but easily arousable. Lives with Mother who is 79 y/o; Pain in Left leg controlled; She feels her throat is dry being NPO since last night. Denies chest pain, SOB, nausea, vomit or abdominal pain    Vital Signs Last 24 Hrs  T(C): 36.3 (09 Oct 2023 17:52), Max: 36.8 (09 Oct 2023 13:53)  T(F): 97.4 (09 Oct 2023 17:52), Max: 98.2 (09 Oct 2023 13:53)  HR: 71 (09 Oct 2023 17:52) (66 - 85)  BP: 162/82 (09 Oct 2023 17:52) (127/69 - 169/87)  BP(mean): 88 (09 Oct 2023 16:45) (88 - 124)  RR: 18 (09 Oct 2023 17:52) (12 - 18)  SpO2: 94% (09 Oct 2023 17:52) (94% - 100%): room air    P/E:  Psych: AAO x3  Neuro: No gross focal deficits; Power and sensation intact  CVS: S1S2 present, regular, no edema  Resp: BLAE+, No wheeze or Rhonchi  GI: Soft, BS+, Non tender, non distended  Extr: No  calf tenderness or edema; Left leg immobilizer in place  Skin: Warm and moist without any rashes    Labs:                      13.2   6.61  )-----------( 302      ( 09 Oct 2023 07:41 )             41.6   10-09    139  |  112<H>  |  23<H>  ----------------------------<  138<H>  4.4   |  27  |  1.20  Ca    8.9      09 Oct 2023 07:41  Phos  3.1     10-09  Mg     2.5     10-09  TPro  7.0  /  Alb  3.2<L>  /  TBili  0.4  /  DBili  x   /  AST  15  /  ALT  26  /  AlkPhos  107  10-09    Xray Knee 3 Views, Left (10.07.23 @ 01:15)   IMPRESSION: Slightly displaced lower horizontal fracture left patella.    A/P:  Left Patellar Fracture s/p ORIF POD# 0  Acute Fracture pain  HTN slightly elevated BP likely due to pain  Thyroid nodule  Hx CAD s/p stent  Tpe 2 DM   Hx Right eye retinal detachment    Plan:  Patient tolerated ORIF well  Pain control  Bowel regimen  Ortho f/u; d/w TOSHA Mulligan this evening; can continue on ASA for DVT ppx as being d/c home; able to ambulate' d/w pt  Resume Aspirin and Plavix in AM if okay with Ortho; Patient advised to take ASA/ Plavix with meals  DVT ppx while Inhouse  PT eval and D/C plan; lives with Mom  Follow up outpatient Endocrine for thyroid nodule f/u  Oral hypoglycemics on hold; ISS while inhouse;     Discussed with Patient findings and plan of care  I will be away 10/10/23 onwards; Hospitalist Colleague to assume care AM

## 2023-10-09 NOTE — PROGRESS NOTE ADULT - PROBLEM SELECTOR PLAN 4
h/o HTN on lisinopril 40 mg, metoprolol 100 at home  Monitor BP  c/w home meds with holding parameters

## 2023-10-09 NOTE — PROGRESS NOTE ADULT - PROBLEM SELECTOR PLAN 3
- Patient takes Metformin, Rybelsus, repaglinide, jardiance,  at home  - will hold home medications  - will start sliding scale  - f/u HgA1c  - diabetic diet - Patient takes Metformin, Rybelsus, repaglinide, jardiance,  at home  - will hold home medications  - will start sliding scale  - diabetic diet

## 2023-10-09 NOTE — BRIEF OPERATIVE NOTE - NSICDXBRIEFPROCEDURE_GEN_ALL_CORE_FT
PROCEDURES:  ORIF fracture of left patella 09-Oct-2023 13:56:17 S/p ORIF of left patella fracture on 10/9/23 Shaq Ascencio

## 2023-10-09 NOTE — PROGRESS NOTE ADULT - SUBJECTIVE AND OBJECTIVE BOX
PGY-1 Progress Note discussed with attending    PAGER #: [[phone]] TILL 5:00 PM  PLEASE CONTACT ON CALL TEAM:  - On Call Team (Please refer to Kiesha) FROM 5:00 PM - 8:30PM  - Nightfloat Team FROM 8:30 -7:30 AM    CHIEF COMPLAINT & BRIEF HOSPITAL COURSE:    INTERVAL HPI/OVERNIGHT EVENTS:       REVIEW OF SYSTEMS:  CONSTITUTIONAL: No fever, weight loss, or fatigue  RESPIRATORY: No cough, wheezing, chills or hemoptysis; No shortness of breath  CARDIOVASCULAR: No chest pain, palpitations, dizziness, or leg swelling  GASTROINTESTINAL: No abdominal pain. No nausea, vomiting, or hematemesis; No diarrhea or constipation. No melena or hematochezia.  GENITOURINARY: No dysuria or hematuria, urinary frequency  NEUROLOGICAL: No headaches, memory loss, loss of strength, numbness, or tremors  SKIN: No itching, burning, rashes, or lesions     Vital Signs Last 24 Hrs  T(C): 36.4 (09 Oct 2023 05:40), Max: 36.4 (08 Oct 2023 21:09)  T(F): 97.6 (09 Oct 2023 05:40), Max: 97.6 (09 Oct 2023 05:40)  HR: 79 (09 Oct 2023 05:40) (68 - 79)  BP: 160/82 (09 Oct 2023 05:40) (118/74 - 160/82)  BP(mean): --  RR: 18 (09 Oct 2023 05:40) (17 - 18)  SpO2: 98% (09 Oct 2023 05:40) (97% - 98%)    Parameters below as of 09 Oct 2023 05:40  Patient On (Oxygen Delivery Method): room air        PHYSICAL EXAMINATION:  GENERAL: NAD, well built  HEAD:  Atraumatic, Normocephalic  EYES:  conjunctiva and sclera clear  NECK: Supple, No JVD, Normal thyroid  CHEST/LUNG: Clear to auscultation. Clear to percussion bilaterally; No rales, rhonchi, wheezing, or rubs  HEART: Regular rate and rhythm; No murmurs, rubs, or gallops  ABDOMEN: Soft, Nontender, Nondistended; Bowel sounds present  NERVOUS SYSTEM:  Alert & Oriented X3,    EXTREMITIES:  2+ Peripheral Pulses, No clubbing, cyanosis, or edema  SKIN: warm dry                          13.2   6.61  )-----------( 302      ( 09 Oct 2023 07:41 )             41.6     10-09    139  |  112<H>  |  23<H>  ----------------------------<  138<H>  4.4   |  27  |  1.20    Ca    8.9      09 Oct 2023 07:41  Phos  3.1     10-09  Mg     2.5     10-09    TPro  7.0  /  Alb  3.2<L>  /  TBili  0.4  /  DBili  x   /  AST  15  /  ALT  26  /  AlkPhos  107  10-09    LIVER FUNCTIONS - ( 09 Oct 2023 07:41 )  Alb: 3.2 g/dL / Pro: 7.0 g/dL / ALK PHOS: 107 U/L / ALT: 26 U/L DA / AST: 15 U/L / GGT: x               PT/INR - ( 09 Oct 2023 07:41 )   PT: 10.3 sec;   INR: 0.90 ratio         PTT - ( 09 Oct 2023 07:41 )  PTT:31.8 sec    CAPILLARY BLOOD GLUCOSE      RADIOLOGY & ADDITIONAL TESTS:                   PGY-1 Progress Note discussed with attending    PAGER #: [[phone]] TILL 5:00 PM  PLEASE CONTACT ON CALL TEAM:  - On Call Team (Please refer to Kiesha) FROM 5:00 PM - 8:30PM  - Nightfloat Team FROM 8:30 -7:30 AM    CHIEF COMPLAINT & BRIEF HOSPITAL COURSE:    INTERVAL HPI/OVERNIGHT EVENTS:       REVIEW OF SYSTEMS:  CONSTITUTIONAL: No fever, weight loss, or fatigue  RESPIRATORY: No cough, wheezing, chills or hemoptysis; No shortness of breath  CARDIOVASCULAR: No chest pain, palpitations, dizziness, or leg swelling  GASTROINTESTINAL: No abdominal pain. No nausea, vomiting, or hematemesis; No diarrhea or constipation. No melena or hematochezia.  GENITOURINARY: No dysuria or hematuria, urinary frequency  NEUROLOGICAL: No headaches, memory loss, loss of strength, numbness, or tremors  SKIN: No itching, burning, rashes, or lesions     Vital Signs Last 24 Hrs  T(C): 36.4 (09 Oct 2023 05:40), Max: 36.4 (08 Oct 2023 21:09)  T(F): 97.6 (09 Oct 2023 05:40), Max: 97.6 (09 Oct 2023 05:40)  HR: 79 (09 Oct 2023 05:40) (68 - 79)  BP: 160/82 (09 Oct 2023 05:40) (118/74 - 160/82)  BP(mean): --  RR: 18 (09 Oct 2023 05:40) (17 - 18)  SpO2: 98% (09 Oct 2023 05:40) (97% - 98%)    Parameters below as of 09 Oct 2023 05:40  Patient On (Oxygen Delivery Method): room air        PHYSICAL EXAMINATION:  GENERAL: NAD, well built  HEAD:  Atraumatic, Normocephalic  EYES:  conjunctiva and sclera clear  NECK: Supple, No JVD, Normal thyroid  CHEST/LUNG: Clear to auscultation. Clear to percussion bilaterally; No rales, rhonchi, wheezing, or rubs  HEART: Regular rate and rhythm; No murmurs, rubs, or gallops  ABDOMEN: Soft, Nontender, Nondistended; Bowel sounds present  NERVOUS SYSTEM:  Alert & Oriented X3,    EXTREMITIES:  2+ Peripheral Pulses, No clubbing, cyanosis, or edema  SKIN: warm dry                          13.2   6.61  )-----------( 302      ( 09 Oct 2023 07:41 )             41.6     10-09    139  |  112<H>  |  23<H>  ----------------------------<  138<H>  4.4   |  27  |  1.20    Ca    8.9      09 Oct 2023 07:41  Phos  3.1     10-09  Mg     2.5     10-09    TPro  7.0  /  Alb  3.2<L>  /  TBili  0.4  /  DBili  x   /  AST  15  /  ALT  26  /  AlkPhos  107  10-09    LIVER FUNCTIONS - ( 09 Oct 2023 07:41 )  Alb: 3.2 g/dL / Pro: 7.0 g/dL / ALK PHOS: 107 U/L / ALT: 26 U/L DA / AST: 15 U/L / GGT: x               PT/INR - ( 09 Oct 2023 07:41 )   PT: 10.3 sec;   INR: 0.90 ratio         PTT - ( 09 Oct 2023 07:41 )  PTT:31.8 sec    CAPILLARY BLOOD GLUCOSE      RADIOLOGY & ADDITIONAL TESTS:                   PGY-1 Progress Note discussed with attending    PAGER #: [[phone]] TILL 5:00 PM  PLEASE CONTACT ON CALL TEAM:  - On Call Team (Please refer to Kiesha) FROM 5:00 PM - 8:30PM  - Nightfloat Team FROM 8:30 -7:30 AM    CHIEF COMPLAINT & BRIEF HOSPITAL COURSE:    INTERVAL HPI/OVERNIGHT EVENTS:       REVIEW OF SYSTEMS:  CONSTITUTIONAL: No fever, weight loss, or fatigue  RESPIRATORY: No cough, wheezing, chills or hemoptysis; No shortness of breath  CARDIOVASCULAR: No chest pain, palpitations, dizziness, or leg swelling  GASTROINTESTINAL: No abdominal pain. No nausea, vomiting, or hematemesis; No diarrhea or constipation. No melena or hematochezia.  GENITOURINARY: No dysuria or hematuria, urinary frequency  NEUROLOGICAL: No headaches, memory loss, loss of strength, numbness, or tremors  SKIN: No itching, burning, rashes, or lesions     Vital Signs Last 24 Hrs  T(C): 36.4 (09 Oct 2023 05:40), Max: 36.4 (08 Oct 2023 21:09)  T(F): 97.6 (09 Oct 2023 05:40), Max: 97.6 (09 Oct 2023 05:40)  HR: 79 (09 Oct 2023 05:40) (68 - 79)  BP: 160/82 (09 Oct 2023 05:40) (118/74 - 160/82)  BP(mean): --  RR: 18 (09 Oct 2023 05:40) (17 - 18)  SpO2: 98% (09 Oct 2023 05:40) (97% - 98%)    Parameters below as of 09 Oct 2023 05:40  Patient On (Oxygen Delivery Method): room air        PHYSICAL EXAMINATION:  GENERAL: NAD, well built  HEAD:  Atraumatic, Normocephalic  EYES:  conjunctiva and sclera clear  NECK: Supple, No JVD, Normal thyroid  CHEST/LUNG: Clear to auscultation. Clear to percussion bilaterally; No rales, rhonchi, wheezing, or rubs  HEART: Regular rate and rhythm; No murmurs, rubs, or gallops  ABDOMEN: Soft, Nontender, Nondistended; Bowel sounds present  NERVOUS SYSTEM:  Alert & Oriented X3,    EXTREMITIES:  2+ Peripheral Pulses, No clubbing, cyanosis, or edema  SKIN: warm dry                          13.2   6.61  )-----------( 302      ( 09 Oct 2023 07:41 )             41.6     10-09    139  |  112<H>  |  23<H>  ----------------------------<  138<H>  4.4   |  27  |  1.20    Ca    8.9      09 Oct 2023 07:41  Phos  3.1     10-09  Mg     2.5     10-09    TPro  7.0  /  Alb  3.2<L>  /  TBili  0.4  /  DBili  x   /  AST  15  /  ALT  26  /  AlkPhos  107  10-09    LIVER FUNCTIONS - ( 09 Oct 2023 07:41 )  Alb: 3.2 g/dL / Pro: 7.0 g/dL / ALK PHOS: 107 U/L / ALT: 26 U/L DA / AST: 15 U/L / GGT: x               PT/INR - ( 09 Oct 2023 07:41 )   PT: 10.3 sec;   INR: 0.90 ratio         PTT - ( 09 Oct 2023 07:41 )  PTT:31.8 sec    CAPILLARY BLOOD GLUCOSE      RADIOLOGY & ADDITIONAL TESTS:                   PGY-1 Progress Note discussed with attending    PAGER #: [[phone]] TILL 5:00 PM  PLEASE CONTACT ON CALL TEAM:  - On Call Team (Please refer to Kiesha) FROM 5:00 PM - 8:30PM  - Nightfloat Team FROM 8:30 -7:30 AM    CHIEF COMPLAINT & BRIEF HOSPITAL COURSE: patella fracture    INTERVAL HPI/OVERNIGHT EVENTS:   no acute overnight events, pt. seen and examined at bedside, offers no complaints. Pt is scheduled for OR today for ORIF surgery.       REVIEW OF SYSTEMS:  CONSTITUTIONAL: No fever, weight loss, or fatigue  RESPIRATORY: No cough, wheezing, chills or hemoptysis; No shortness of breath  CARDIOVASCULAR: No chest pain, palpitations, dizziness, or leg swelling  GASTROINTESTINAL: No abdominal pain. No nausea, vomiting, or hematemesis; No diarrhea or constipation. No melena or hematochezia.  GENITOURINARY: No dysuria or hematuria, urinary frequency  NEUROLOGICAL: No headaches, memory loss, loss of strength, numbness, or tremors  SKIN: No itching, burning, rashes, or lesions     Vital Signs Last 24 Hrs  T(C): 36.4 (09 Oct 2023 05:40), Max: 36.4 (08 Oct 2023 21:09)  T(F): 97.6 (09 Oct 2023 05:40), Max: 97.6 (09 Oct 2023 05:40)  HR: 79 (09 Oct 2023 05:40) (68 - 79)  BP: 160/82 (09 Oct 2023 05:40) (118/74 - 160/82)  BP(mean): --  RR: 18 (09 Oct 2023 05:40) (17 - 18)  SpO2: 98% (09 Oct 2023 05:40) (97% - 98%)    Parameters below as of 09 Oct 2023 05:40  Patient On (Oxygen Delivery Method): room air        PHYSICAL EXAMINATION:  GENERAL: NAD, well-groomed, well-developed  HEAD:  Atraumatic, Normocephalic  EYES: EOMI, PERRLA, conjunctiva and sclera clear  ENMT: No tonsillar erythema, exudates, or enlargement; Moist mucous membranes, Good dentition, No lesions  NECK: Supple, normal appearance, No JVD; Normal thyroid; Trachea midline  NERVOUS SYSTEM:  Alert & Oriented X3,  Motor Strength 5/5 B/L upper and lower extremities, sensation intact  CHEST/LUNG: Lungs clear to auscultation bilaterally, No rales, rhonchi, wheezing   HEART: Regular rate and rhythm; No murmurs, rubs, or gallops  ABDOMEN: Soft, Nontender, Nondistended; Bowel sounds present  EXTREMITIES:  2+ Peripheral Pulses, +tenderness of the L knee, limited range of motion, swelling and edema of L knee, Tenderness R ankle.   LYMPH: No lymphadenopathy noted  SKIN: No rashes or lesions;  Good capillary refill                        13.2   6.61  )-----------( 302      ( 09 Oct 2023 07:41 )             41.6     10-09    139  |  112<H>  |  23<H>  ----------------------------<  138<H>  4.4   |  27  |  1.20    Ca    8.9      09 Oct 2023 07:41  Phos  3.1     10-09  Mg     2.5     10-09    TPro  7.0  /  Alb  3.2<L>  /  TBili  0.4  /  DBili  x   /  AST  15  /  ALT  26  /  AlkPhos  107  10-09    LIVER FUNCTIONS - ( 09 Oct 2023 07:41 )  Alb: 3.2 g/dL / Pro: 7.0 g/dL / ALK PHOS: 107 U/L / ALT: 26 U/L DA / AST: 15 U/L / GGT: x               PT/INR - ( 09 Oct 2023 07:41 )   PT: 10.3 sec;   INR: 0.90 ratio         PTT - ( 09 Oct 2023 07:41 )  PTT:31.8 sec    CAPILLARY BLOOD GLUCOSE      RADIOLOGY & ADDITIONAL TESTS:                   PGY-1 Progress Note discussed with attending    PAGER #: [[phone]] TILL 5:00 PM  PLEASE CONTACT ON CALL TEAM:  - On Call Team (Please refer to Kiesha) FROM 5:00 PM - 8:30PM  - Nightfloat Team FROM 8:30 -7:30 AM    CHIEF COMPLAINT & BRIEF HOSPITAL COURSE: patella fracture    INTERVAL HPI/OVERNIGHT EVENTS:   no acute overnight events, pt. seen and examined at bedside, offers no complaints. Pt is scheduled for OR today for ORIF surgery.       REVIEW OF SYSTEMS:  CONSTITUTIONAL: No fever, weight loss, or fatigue  RESPIRATORY: No cough, wheezing, chills or hemoptysis; No shortness of breath  CARDIOVASCULAR: No chest pain, palpitations, dizziness, or leg swelling  GASTROINTESTINAL: No abdominal pain. No nausea, vomiting, or hematemesis; No diarrhea or constipation. No melena or hematochezia.  GENITOURINARY: No dysuria or hematuria, urinary frequency  NEUROLOGICAL: No headaches, memory loss, loss of strength, numbness, or tremors  SKIN: No itching, burning, rashes, or lesions     Vital Signs Last 24 Hrs  T(C): 36.4 (09 Oct 2023 05:40), Max: 36.4 (08 Oct 2023 21:09)  T(F): 97.6 (09 Oct 2023 05:40), Max: 97.6 (09 Oct 2023 05:40)  HR: 79 (09 Oct 2023 05:40) (68 - 79)  BP: 160/82 (09 Oct 2023 05:40) (118/74 - 160/82)  BP(mean): --  RR: 18 (09 Oct 2023 05:40) (17 - 18)  SpO2: 98% (09 Oct 2023 05:40) (97% - 98%)    Parameters below as of 09 Oct 2023 05:40  Patient On (Oxygen Delivery Method): room air        PHYSICAL EXAMINATION:  GENERAL: NAD, well-groomed, well-developed  HEAD:  Atraumatic, Normocephalic  EYES: EOMI, PERRLA, conjunctiva and sclera clear  ENMT: No tonsillar erythema, exudates, or enlargement; Moist mucous membranes, Good dentition, No lesions  NECK: Supple, normal appearance, No JVD; Normal thyroid; Trachea midline  NERVOUS SYSTEM:  Alert & Oriented X3,  Motor Strength 5/5 B/L upper and lower extremities, sensation intact  CHEST/LUNG: Lungs clear to auscultation bilaterally, No rales, rhonchi, wheezing   HEART: Regular rate and rhythm; No murmurs, rubs, or gallops  ABDOMEN: Soft, Nontender, Nondistended; Bowel sounds present  EXTREMITIES:  2+ Peripheral Pulses, +tenderness of the L knee, limited range of motion, swelling and edema of L knee, Tenderness R ankle.   LYMPH: No lymphadenopathy noted  SKIN: No rashes or lesions;  Good capillary refill                        13.2   6.61  )-----------( 302      ( 09 Oct 2023 07:41 )             41.6     10-09    139  |  112<H>  |  23<H>  ----------------------------<  138<H>  4.4   |  27  |  1.20    Ca    8.9      09 Oct 2023 07:41  Phos  3.1     10-09  Mg     2.5     10-09    TPro  7.0  /  Alb  3.2<L>  /  TBili  0.4  /  DBili  x   /  AST  15  /  ALT  26  /  AlkPhos  107  10-09    LIVER FUNCTIONS - ( 09 Oct 2023 07:41 )  Alb: 3.2 g/dL / Pro: 7.0 g/dL / ALK PHOS: 107 U/L / ALT: 26 U/L DA / AST: 15 U/L / GGT: x               PT/INR - ( 09 Oct 2023 07:41 )   PT: 10.3 sec;   INR: 0.90 ratio         PTT - ( 09 Oct 2023 07:41 )  PTT:31.8 sec    CAPILLARY BLOOD GLUCOSE      RADIOLOGY & ADDITIONAL TESTS:                   PGY-1 Progress Note discussed with attending    PAGER #: [[phone]] TILL 5:00 PM  PLEASE CONTACT ON CALL TEAM:  - On Call Team (Please refer to Kiesha) FROM 5:00 PM - 8:30PM  - Nightfloat Team FROM 8:30 -7:30 AM    CHIEF COMPLAINT & BRIEF HOSPITAL COURSE: patella fracture    INTERVAL HPI/OVERNIGHT EVENTS:   no acute overnight events, pt. seen and examined at bedside, offers no complaints. Pt is scheduled for OR today for ORIF surgery.       REVIEW OF SYSTEMS:  CONSTITUTIONAL: No fever, weight loss, or fatigue  RESPIRATORY: No cough, wheezing, chills or hemoptysis; No shortness of breath  CARDIOVASCULAR: No chest pain, palpitations, dizziness, or leg swelling  GASTROINTESTINAL: No abdominal pain. No nausea, vomiting, or hematemesis; No diarrhea or constipation. No melena or hematochezia.  GENITOURINARY: No dysuria or hematuria, urinary frequency  NEUROLOGICAL: No headaches, memory loss, loss of strength, numbness, or tremors  SKIN: No itching, burning, rashes, or lesions     Vital Signs Last 24 Hrs  T(C): 36.4 (09 Oct 2023 05:40), Max: 36.4 (08 Oct 2023 21:09)  T(F): 97.6 (09 Oct 2023 05:40), Max: 97.6 (09 Oct 2023 05:40)  HR: 79 (09 Oct 2023 05:40) (68 - 79)  BP: 160/82 (09 Oct 2023 05:40) (118/74 - 160/82)  BP(mean): --  RR: 18 (09 Oct 2023 05:40) (17 - 18)  SpO2: 98% (09 Oct 2023 05:40) (97% - 98%)    Parameters below as of 09 Oct 2023 05:40  Patient On (Oxygen Delivery Method): room air        PHYSICAL EXAMINATION:  GENERAL: NAD, well-groomed, well-developed  HEAD:  Atraumatic, Normocephalic  EYES: EOMI, PERRLA, conjunctiva and sclera clear  ENMT: No tonsillar erythema, exudates, or enlargement; Moist mucous membranes, Good dentition, No lesions  NECK: Supple, normal appearance, No JVD; Normal thyroid; Trachea midline  NERVOUS SYSTEM:  Alert & Oriented X3,  Motor Strength 5/5 B/L upper and lower extremities, sensation intact  CHEST/LUNG: Lungs clear to auscultation bilaterally, No rales, rhonchi, wheezing   HEART: Regular rate and rhythm; No murmurs, rubs, or gallops  ABDOMEN: Soft, Nontender, Nondistended; Bowel sounds present  EXTREMITIES:  2+ Peripheral Pulses, +tenderness of the L knee, limited range of motion, swelling and edema of L knee, Tenderness R ankle.   LYMPH: No lymphadenopathy noted  SKIN: No rashes or lesions;  Good capillary refill                        13.2   6.61  )-----------( 302      ( 09 Oct 2023 07:41 )             41.6     10-09    139  |  112<H>  |  23<H>  ----------------------------<  138<H>  4.4   |  27  |  1.20    Ca    8.9      09 Oct 2023 07:41  Phos  3.1     10-09  Mg     2.5     10-09    TPro  7.0  /  Alb  3.2<L>  /  TBili  0.4  /  DBili  x   /  AST  15  /  ALT  26  /  AlkPhos  107  10-09    LIVER FUNCTIONS - ( 09 Oct 2023 07:41 )  Alb: 3.2 g/dL / Pro: 7.0 g/dL / ALK PHOS: 107 U/L / ALT: 26 U/L DA / AST: 15 U/L / GGT: x               PT/INR - ( 09 Oct 2023 07:41 )   PT: 10.3 sec;   INR: 0.90 ratio         PTT - ( 09 Oct 2023 07:41 )  PTT:31.8 sec    CAPILLARY BLOOD GLUCOSE      RADIOLOGY & ADDITIONAL TESTS:

## 2023-10-09 NOTE — PROGRESS NOTE ADULT - SUBJECTIVE AND OBJECTIVE BOX
Pt Name: NANCY BRUSH  MRN: [id number]    ORTHOPEDICS    Orthopedic diagnosis: Left patella fracture    62yFemaleHPI:  Patient seen and evaluated at bedside. Patient with pain to left knee.  Patient pending surgical fixation of left patella today  Pt denies Chest pain, SOB, dyspnea, paresthesias, N/V/D, abdominal pain, syncope, or pain anywhere else.       PHYSICAL EXAM:    Vital Signs Last 24 Hrs  T(C): 36.4 (09 Oct 2023 05:40), Max: 36.4 (08 Oct 2023 21:09)  T(F): 97.6 (09 Oct 2023 05:40), Max: 97.6 (09 Oct 2023 05:40)  HR: 79 (09 Oct 2023 05:40) (68 - 79)  BP: 160/82 (09 Oct 2023 05:40) (118/74 - 160/82)  BP(mean): --  RR: 18 (09 Oct 2023 05:40) (17 - 18)  SpO2: 98% (09 Oct 2023 05:40) (97% - 98%)    Parameters below as of 09 Oct 2023 05:40  Patient On (Oxygen Delivery Method): room air        Gen: well developed, well nourished, comfortable  Musculoskeletal:    Left knee:  Skin warm and pink. Effusion noted to left knee. Knee immobilizer intact. TTP noted to inferior patella. Unable to SLR. Unable to extend the knee. 2+ DP pulses. SILT. Calves soft and NTTP b/l    LABS:                        13.2   6.61  )-----------( 302      ( 09 Oct 2023 07:41 )             41.6     10-09    139  |  112<H>  |  23<H>  ----------------------------<  138<H>  4.4   |  27  |  1.20    Ca    8.9      09 Oct 2023 07:41  Phos  3.1     10-09  Mg     2.5     10-09    TPro  7.0  /  Alb  3.2<L>  /  TBili  0.4  /  DBili  x   /  AST  15  /  ALT  26  /  AlkPhos  107  10-09    PT/INR - ( 09 Oct 2023 07:41 )   PT: 10.3 sec;   INR: 0.90 ratio         PTT - ( 09 Oct 2023 07:41 )  PTT:31.8 sec    IMPRESSION: Pt is a  62y Female with Left patella fracture     PLAN:  -  Recommendation: [  ] Conservative treatment   [ XX ] Surgical treatment/fixation  -  Pain control   - Keep NPO for surgery today   -  DVT prophylaxis with venodynes    > Hold all anticoagulation today for sugery   -  Continue with knee immobilizer to LLE   -  Consent: to be obtained by surgeon  -  Medical clearance noted  -  Case d/w Dr. Chandra  Pt Name: NANCY BRUSH  MRN: [id number]    ORTHOPEDICS    Orthopedic diagnosis: Left patella fracture    62yFemaleHPI:  Patient seen and evaluated at bedside. Patient with pain to left knee.  Patient pending surgical fixation of left patella today  Pt denies Chest pain, SOB, dyspnea, paresthesias, N/V/D, abdominal pain, syncope, or pain anywhere else.       PHYSICAL EXAM:    Vital Signs Last 24 Hrs  T(C): 36.4 (09 Oct 2023 05:40), Max: 36.4 (08 Oct 2023 21:09)  T(F): 97.6 (09 Oct 2023 05:40), Max: 97.6 (09 Oct 2023 05:40)  HR: 79 (09 Oct 2023 05:40) (68 - 79)  BP: 160/82 (09 Oct 2023 05:40) (118/74 - 160/82)  BP(mean): --  RR: 18 (09 Oct 2023 05:40) (17 - 18)  SpO2: 98% (09 Oct 2023 05:40) (97% - 98%)    Parameters below as of 09 Oct 2023 05:40  Patient On (Oxygen Delivery Method): room air        Gen: well developed, well nourished, comfortable  Musculoskeletal:    Left knee:  Skin warm and pink. Effusion noted to left knee. Knee immobilizer intact. TTP noted to inferior patella. Unable to SLR. Unable to extend the knee. 2+ DP pulses. SILT. Calves soft and NTTP b/l    LABS:                        13.2   6.61  )-----------( 302      ( 09 Oct 2023 07:41 )             41.6     10-09    139  |  112<H>  |  23<H>  ----------------------------<  138<H>  4.4   |  27  |  1.20    Ca    8.9      09 Oct 2023 07:41  Phos  3.1     10-09  Mg     2.5     10-09    TPro  7.0  /  Alb  3.2<L>  /  TBili  0.4  /  DBili  x   /  AST  15  /  ALT  26  /  AlkPhos  107  10-09    PT/INR - ( 09 Oct 2023 07:41 )   PT: 10.3 sec;   INR: 0.90 ratio         PTT - ( 09 Oct 2023 07:41 )  PTT:31.8 sec    IMPRESSION: Pt is a  62y Female with Left patella fracture     PLAN:  -  Recommendation: [  ] Conservative treatment   [ XX ] Surgical treatment/fixation  -  Pain control   - Keep NPO for surgery today   -  DVT prophylaxis with venodynes    > Hold all anticoagulation today for sugery   -  Continue with knee immobilizer to LLE   -  Consent: to be obtained by surgeon  -  Medical clearance noted  -  Case d/w Dr. Chandra  Pt Name: NANCY BRUSH  MRN: [id number]    ORTHOPEDICS    Orthopedic diagnosis: Left patella fracture    62yFemaleHPI:  Patient seen and evaluated at bedside. Patient with pain to left knee.  Patient pending surgical fixation of left patella today  Pt denies Chest pain, SOB, dyspnea, paresthesias, N/V/D, abdominal pain, syncope, or pain anywhere else.       PHYSICAL EXAM:    Vital Signs Last 24 Hrs  T(C): 36.4 (09 Oct 2023 05:40), Max: 36.4 (08 Oct 2023 21:09)  T(F): 97.6 (09 Oct 2023 05:40), Max: 97.6 (09 Oct 2023 05:40)  HR: 79 (09 Oct 2023 05:40) (68 - 79)  BP: 160/82 (09 Oct 2023 05:40) (118/74 - 160/82)  BP(mean): --  RR: 18 (09 Oct 2023 05:40) (17 - 18)  SpO2: 98% (09 Oct 2023 05:40) (97% - 98%)    Parameters below as of 09 Oct 2023 05:40  Patient On (Oxygen Delivery Method): room air        Gen: well developed, well nourished, comfortable  Musculoskeletal:    Left knee:  Skin warm and pink. Effusion noted to left knee. Knee immobilizer intact. TTP noted to inferior patella. Unable to SLR. Unable to extend the knee. 2+ DP pulses. SILT. Calves soft and NTTP b/l    LABS:                        13.2   6.61  )-----------( 302      ( 09 Oct 2023 07:41 )             41.6     10-09    139  |  112<H>  |  23<H>  ----------------------------<  138<H>  4.4   |  27  |  1.20    Ca    8.9      09 Oct 2023 07:41  Phos  3.1     10-09  Mg     2.5     10-09    TPro  7.0  /  Alb  3.2<L>  /  TBili  0.4  /  DBili  x   /  AST  15  /  ALT  26  /  AlkPhos  107  10-09    PT/INR - ( 09 Oct 2023 07:41 )   PT: 10.3 sec;   INR: 0.90 ratio         PTT - ( 09 Oct 2023 07:41 )  PTT:31.8 sec    IMPRESSION: Pt is a  62y Female with Left patella fracture     PLAN:  -  Recommendation: [  ] Conservative treatment   [ XX ] Surgical treatment/fixation  -  Pain control   - Keep NPO for surgery today   -  DVT prophylaxis with venodynes    > Hold all anticoagulation today for sugery   -  Continue with knee immobilizer to LLE   -  Consent: to be obtained by surgeon  -  Medical clearance noted  -  Case d/w Dr. Chandra

## 2023-10-10 ENCOUNTER — TRANSCRIPTION ENCOUNTER (OUTPATIENT)
Age: 63
End: 2023-10-10

## 2023-10-10 VITALS
HEART RATE: 73 BPM | SYSTOLIC BLOOD PRESSURE: 165 MMHG | RESPIRATION RATE: 18 BRPM | DIASTOLIC BLOOD PRESSURE: 83 MMHG | OXYGEN SATURATION: 97 %

## 2023-10-10 DIAGNOSIS — Z02.9 ENCOUNTER FOR ADMINISTRATIVE EXAMINATIONS, UNSPECIFIED: ICD-10-CM

## 2023-10-10 LAB
ALBUMIN SERPL ELPH-MCNC: 3.2 G/DL — LOW (ref 3.5–5)
ALP SERPL-CCNC: 102 U/L — SIGNIFICANT CHANGE UP (ref 40–120)
ALT FLD-CCNC: 25 U/L DA — SIGNIFICANT CHANGE UP (ref 10–60)
ANION GAP SERPL CALC-SCNC: 6 MMOL/L — SIGNIFICANT CHANGE UP (ref 5–17)
AST SERPL-CCNC: 18 U/L — SIGNIFICANT CHANGE UP (ref 10–40)
BASOPHILS # BLD AUTO: 0.05 K/UL — SIGNIFICANT CHANGE UP (ref 0–0.2)
BASOPHILS NFR BLD AUTO: 0.6 % — SIGNIFICANT CHANGE UP (ref 0–2)
BILIRUB SERPL-MCNC: 0.7 MG/DL — SIGNIFICANT CHANGE UP (ref 0.2–1.2)
BUN SERPL-MCNC: 17 MG/DL — SIGNIFICANT CHANGE UP (ref 7–18)
CALCIUM SERPL-MCNC: 8.9 MG/DL — SIGNIFICANT CHANGE UP (ref 8.4–10.5)
CHLORIDE SERPL-SCNC: 108 MMOL/L — SIGNIFICANT CHANGE UP (ref 96–108)
CO2 SERPL-SCNC: 24 MMOL/L — SIGNIFICANT CHANGE UP (ref 22–31)
CREAT SERPL-MCNC: 1.11 MG/DL — SIGNIFICANT CHANGE UP (ref 0.5–1.3)
EGFR: 57 ML/MIN/1.73M2 — LOW
EOSINOPHIL # BLD AUTO: 0.06 K/UL — SIGNIFICANT CHANGE UP (ref 0–0.5)
EOSINOPHIL NFR BLD AUTO: 0.7 % — SIGNIFICANT CHANGE UP (ref 0–6)
GLUCOSE BLDC GLUCOMTR-MCNC: 137 MG/DL — HIGH (ref 70–99)
GLUCOSE BLDC GLUCOMTR-MCNC: 160 MG/DL — HIGH (ref 70–99)
GLUCOSE BLDC GLUCOMTR-MCNC: 243 MG/DL — HIGH (ref 70–99)
GLUCOSE SERPL-MCNC: 134 MG/DL — HIGH (ref 70–99)
HCT VFR BLD CALC: 39.5 % — SIGNIFICANT CHANGE UP (ref 34.5–45)
HGB BLD-MCNC: 12.7 G/DL — SIGNIFICANT CHANGE UP (ref 11.5–15.5)
IMM GRANULOCYTES NFR BLD AUTO: 0.3 % — SIGNIFICANT CHANGE UP (ref 0–0.9)
LYMPHOCYTES # BLD AUTO: 1.6 K/UL — SIGNIFICANT CHANGE UP (ref 1–3.3)
LYMPHOCYTES # BLD AUTO: 18.2 % — SIGNIFICANT CHANGE UP (ref 13–44)
MAGNESIUM SERPL-MCNC: 2 MG/DL — SIGNIFICANT CHANGE UP (ref 1.6–2.6)
MCHC RBC-ENTMCNC: 28 PG — SIGNIFICANT CHANGE UP (ref 27–34)
MCHC RBC-ENTMCNC: 32.2 GM/DL — SIGNIFICANT CHANGE UP (ref 32–36)
MCV RBC AUTO: 87 FL — SIGNIFICANT CHANGE UP (ref 80–100)
MONOCYTES # BLD AUTO: 0.73 K/UL — SIGNIFICANT CHANGE UP (ref 0–0.9)
MONOCYTES NFR BLD AUTO: 8.3 % — SIGNIFICANT CHANGE UP (ref 2–14)
NEUTROPHILS # BLD AUTO: 6.31 K/UL — SIGNIFICANT CHANGE UP (ref 1.8–7.4)
NEUTROPHILS NFR BLD AUTO: 71.9 % — SIGNIFICANT CHANGE UP (ref 43–77)
NRBC # BLD: 0 /100 WBCS — SIGNIFICANT CHANGE UP (ref 0–0)
PHOSPHATE SERPL-MCNC: 3.6 MG/DL — SIGNIFICANT CHANGE UP (ref 2.5–4.5)
PLATELET # BLD AUTO: 293 K/UL — SIGNIFICANT CHANGE UP (ref 150–400)
POTASSIUM SERPL-MCNC: 4.3 MMOL/L — SIGNIFICANT CHANGE UP (ref 3.5–5.3)
POTASSIUM SERPL-SCNC: 4.3 MMOL/L — SIGNIFICANT CHANGE UP (ref 3.5–5.3)
PROT SERPL-MCNC: 7 G/DL — SIGNIFICANT CHANGE UP (ref 6–8.3)
RBC # BLD: 4.54 M/UL — SIGNIFICANT CHANGE UP (ref 3.8–5.2)
RBC # FLD: 14.3 % — SIGNIFICANT CHANGE UP (ref 10.3–14.5)
SODIUM SERPL-SCNC: 138 MMOL/L — SIGNIFICANT CHANGE UP (ref 135–145)
WBC # BLD: 8.78 K/UL — SIGNIFICANT CHANGE UP (ref 3.8–10.5)
WBC # FLD AUTO: 8.78 K/UL — SIGNIFICANT CHANGE UP (ref 3.8–10.5)

## 2023-10-10 PROCEDURE — 85025 COMPLETE CBC W/AUTO DIFF WBC: CPT

## 2023-10-10 PROCEDURE — 84443 ASSAY THYROID STIM HORMONE: CPT

## 2023-10-10 PROCEDURE — 97162 PT EVAL MOD COMPLEX 30 MIN: CPT

## 2023-10-10 PROCEDURE — 36415 COLL VENOUS BLD VENIPUNCTURE: CPT

## 2023-10-10 PROCEDURE — 84100 ASSAY OF PHOSPHORUS: CPT

## 2023-10-10 PROCEDURE — 99239 HOSP IP/OBS DSCHRG MGMT >30: CPT

## 2023-10-10 PROCEDURE — 86900 BLOOD TYPING SEROLOGIC ABO: CPT

## 2023-10-10 PROCEDURE — 80053 COMPREHEN METABOLIC PANEL: CPT

## 2023-10-10 PROCEDURE — C1713: CPT

## 2023-10-10 PROCEDURE — 96372 THER/PROPH/DIAG INJ SC/IM: CPT

## 2023-10-10 PROCEDURE — 73610 X-RAY EXAM OF ANKLE: CPT

## 2023-10-10 PROCEDURE — 83735 ASSAY OF MAGNESIUM: CPT

## 2023-10-10 PROCEDURE — 86803 HEPATITIS C AB TEST: CPT

## 2023-10-10 PROCEDURE — 86923 COMPATIBILITY TEST ELECTRIC: CPT

## 2023-10-10 PROCEDURE — 86901 BLOOD TYPING SEROLOGIC RH(D): CPT

## 2023-10-10 PROCEDURE — 86850 RBC ANTIBODY SCREEN: CPT

## 2023-10-10 PROCEDURE — 82962 GLUCOSE BLOOD TEST: CPT

## 2023-10-10 PROCEDURE — 80048 BASIC METABOLIC PNL TOTAL CA: CPT

## 2023-10-10 PROCEDURE — 70450 CT HEAD/BRAIN W/O DYE: CPT | Mod: MA

## 2023-10-10 PROCEDURE — 99285 EMERGENCY DEPT VISIT HI MDM: CPT

## 2023-10-10 PROCEDURE — 85730 THROMBOPLASTIN TIME PARTIAL: CPT

## 2023-10-10 PROCEDURE — 72125 CT NECK SPINE W/O DYE: CPT | Mod: MA

## 2023-10-10 PROCEDURE — 99232 SBSQ HOSP IP/OBS MODERATE 35: CPT

## 2023-10-10 PROCEDURE — 93005 ELECTROCARDIOGRAM TRACING: CPT

## 2023-10-10 PROCEDURE — 73562 X-RAY EXAM OF KNEE 3: CPT

## 2023-10-10 PROCEDURE — 85610 PROTHROMBIN TIME: CPT

## 2023-10-10 PROCEDURE — 73620 X-RAY EXAM OF FOOT: CPT

## 2023-10-10 RX ORDER — POLYETHYLENE GLYCOL 3350 17 G/17G
17 POWDER, FOR SOLUTION ORAL DAILY
Refills: 0 | Status: DISCONTINUED | OUTPATIENT
Start: 2023-10-10 | End: 2023-10-10

## 2023-10-10 RX ORDER — ACETAMINOPHEN 500 MG
650 TABLET ORAL EVERY 6 HOURS
Refills: 0 | Status: DISCONTINUED | OUTPATIENT
Start: 2023-10-10 | End: 2023-10-10

## 2023-10-10 RX ORDER — ACETAMINOPHEN 500 MG
1000 TABLET ORAL EVERY 8 HOURS
Refills: 0 | Status: DISCONTINUED | OUTPATIENT
Start: 2023-10-10 | End: 2023-10-10

## 2023-10-10 RX ORDER — OXYCODONE HYDROCHLORIDE 5 MG/1
1 TABLET ORAL
Qty: 12 | Refills: 0
Start: 2023-10-10 | End: 2023-10-12

## 2023-10-10 RX ADMIN — Medication 1: at 08:12

## 2023-10-10 RX ADMIN — POLYETHYLENE GLYCOL 3350 17 GRAM(S): 17 POWDER, FOR SOLUTION ORAL at 11:21

## 2023-10-10 RX ADMIN — OXYCODONE HYDROCHLORIDE 5 MILLIGRAM(S): 5 TABLET ORAL at 18:01

## 2023-10-10 RX ADMIN — LISINOPRIL 40 MILLIGRAM(S): 2.5 TABLET ORAL at 05:24

## 2023-10-10 RX ADMIN — Medication 650 MILLIGRAM(S): at 02:26

## 2023-10-10 RX ADMIN — CLOPIDOGREL BISULFATE 75 MILLIGRAM(S): 75 TABLET, FILM COATED ORAL at 11:22

## 2023-10-10 RX ADMIN — Medication 81 MILLIGRAM(S): at 11:22

## 2023-10-10 RX ADMIN — Medication 100 MILLIGRAM(S): at 05:24

## 2023-10-10 RX ADMIN — OXYCODONE HYDROCHLORIDE 5 MILLIGRAM(S): 5 TABLET ORAL at 00:26

## 2023-10-10 RX ADMIN — Medication 1000 MILLIGRAM(S): at 13:36

## 2023-10-10 RX ADMIN — Medication 650 MILLIGRAM(S): at 03:30

## 2023-10-10 RX ADMIN — Medication 2: at 16:52

## 2023-10-10 RX ADMIN — OXYCODONE HYDROCHLORIDE 5 MILLIGRAM(S): 5 TABLET ORAL at 10:18

## 2023-10-10 RX ADMIN — OXYCODONE HYDROCHLORIDE 5 MILLIGRAM(S): 5 TABLET ORAL at 05:24

## 2023-10-10 RX ADMIN — ENOXAPARIN SODIUM 40 MILLIGRAM(S): 100 INJECTION SUBCUTANEOUS at 06:51

## 2023-10-10 RX ADMIN — Medication 1000 MILLIGRAM(S): at 11:21

## 2023-10-10 RX ADMIN — Medication 100 MILLIGRAM(S): at 05:25

## 2023-10-10 RX ADMIN — OXYCODONE HYDROCHLORIDE 5 MILLIGRAM(S): 5 TABLET ORAL at 01:30

## 2023-10-10 NOTE — PROGRESS NOTE ADULT - PROBLEM SELECTOR PLAN 5
hx of MI in 2015 s/p stent  pt takes asa, plavix at home  c/w home meds.
- pt takes atorvastatin 80 mg at home  - c/w atorvastatin 80 mg   - f/u lipid profile  - Dash Diet

## 2023-10-10 NOTE — DISCHARGE NOTE NURSING/CASE MANAGEMENT/SOCIAL WORK - PATIENT PORTAL LINK FT
You can access the FollowMyHealth Patient Portal offered by [facility] by registering at the following website: [email or web address]. By joining [employer]’s FollowMyHealth portal, you will also be able to view your health information using other applications (apps) compatible with our system. You can access the FollowMyHealth Patient Portal offered by [facility] by registering at the following website: [email or web address]. By joining [employer]’s FollowMyHealth portal, you will also be able to view your health information using other applications (apps) compatible with our system. You can access the FollowMyHealth Patient Portal offered by [facility] by registering at the following website: [email or web address]. By joining [employer]’s FollowMyHealth portal, you will also be able to view your health information using other applications (apps) compatible with our system.

## 2023-10-10 NOTE — PHYSICAL THERAPY INITIAL EVALUATION ADULT - HEALTH SCREEN CRITERIA
You can access the FollowMyHealth Patient Portal offered by [facility] by registering at the following website: [email or web address]. By joining [employer]’s FollowMyHealth portal, you will also be able to view your health information using other applications (apps) compatible with our system.
yes

## 2023-10-10 NOTE — PHYSICAL THERAPY INITIAL EVALUATION ADULT - PATIENT/FAMILY/SIGNIFICANT OTHER GOALS STATEMENT, PT EVAL
will be able to perform usual mobility, transfers, and ADLs independently and pain-free without an assistive device

## 2023-10-10 NOTE — PROGRESS NOTE ADULT - NSPROGADDITIONALINFOA_GEN_ALL_CORE
I agree with the above note and have personally seen and examined this patient. All pertinent films have been reviewed. Please refer to clinical documentation of the history, physical examinations, data summary, and both assessment and plan as documented above and with which I agree.    - Plan for ORIF left patella today    José Chandra MD  Attending Orthopedic Surgeon
I agree with the above note and have personally seen and examined this patient. All pertinent films have been reviewed. Please refer to clinical documentation of the history, physical examinations, data summary, and both assessment and plan as documented above and with which I agree.    José Chandra MD  Attending Orthopedic Surgeon

## 2023-10-10 NOTE — PROGRESS NOTE ADULT - PROBLEM SELECTOR PLAN 6
pt with hx of MI in 2015 s/p stent  pt takes asa, plavix at home  c/w home meds
right eye blindness  safety precautions

## 2023-10-10 NOTE — DISCHARGE NOTE NURSING/CASE MANAGEMENT/SOCIAL WORK - NSDCPEPAMP_GEN_ALL_CORE
“[facility] for Tobacco Control” pamphlet given “[facility] for Tobacco Control” pamphlet given “[facility] for Tobacco Control” pamphlet given

## 2023-10-10 NOTE — DISCHARGE NOTE NURSING/CASE MANAGEMENT/SOCIAL WORK - NSDCPEEMAIL_GEN_ALL_CORE
[facility] for Tobacco Control email [email or web address] for Tobacco Control email [email or web address] for Tobacco Control email [email or web address]

## 2023-10-10 NOTE — PHYSICAL THERAPY INITIAL EVALUATION ADULT - SKIN INTEGRITY
on left knee area; currently covered by wound dressing, ACE wrap, and on vivek brace locked in full knee extension/surgical incision/wound on left knee area; currently covered by wound dressing, ACE wrap, and on vievk brace locked in full knee extension/surgical incision/wound

## 2023-10-10 NOTE — DISCHARGE NOTE PROVIDER - NSDCCPCAREPLAN_GEN_ALL_CORE_FT
PRINCIPAL DISCHARGE DIAGNOSIS  Diagnosis: Patella fracture  Assessment and Plan of Treatment: You presented to ER after a fall. You were found to have a left patella fracture. You were evaluted by Orthopedics and had surgery on 10/9/23.  keep LLE locked in extension in vivek brace at all times  Pain medications as needed  Bowel regimen to avoid constipation  continue to use your Incentive Spirometer  follow up with Dr. Chandra in one week at [address] [facility]. Please call [phone]        SECONDARY DISCHARGE DIAGNOSES  Diagnosis: HTN (hypertension)  Assessment and Plan of Treatment: Continue to take your home blood pressure medications as prescribed.  Follow-up with your PCP for regular monitoring of your blood pressure    Diagnosis: DM (diabetes mellitus)  Assessment and Plan of Treatment: You can resume your home regimen of diabetic medications. Please follow up with your PCP in one week.   Make sure you get your HgA1c checked every three months.  It's important not to skip any meals.  Keep a list of your current medications including injectables and over the counter medications and bring this medication list with you to all your doctor appointments.  If you have not seen your ophthalmologist this year call for appointment.  Check your feet daily for redness, sores, or openings. Do not self treat. If no improvement in two days call your primary care physician for an appointment.      Diagnosis: HLD (hyperlipidemia)  Assessment and Plan of Treatment: Continue to take your statin as prescribed and follow the DASH diet    Diagnosis: Myocardial infarction  Assessment and Plan of Treatment: Continue with your Aspirin and Plavix as prescribed.  Seek immediate medical intervention in the event you experience chest pain, dizziness, SOB or palpitations.    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 Please follow-up with Endocrinology to monitor this nodule    Diagnosis: Unsteady gait when walking  Assessment and Plan of Treatment:      PRINCIPAL DISCHARGE DIAGNOSIS  Diagnosis: Patella fracture  Assessment and Plan of Treatment: You presented to ER after a fall. You were found to have a left patella fracture. You were evaluted by Orthopedics and had surgery on 10/9/23.  keep LLE locked in extension in vivek brace at all times  Pain medications as needed  Bowel regimen to avoid constipation  continue to use your Incentive Spirometer  follow up with Dr. Chandra in one week at [address] [facility]. Please call [phone]        SECONDARY DISCHARGE DIAGNOSES  Diagnosis: HTN (hypertension)  Assessment and Plan of Treatment: Continue to take your home blood pressure medications as prescribed.  Follow-up with your PCP for regular monitoring of your blood pressure    Diagnosis: DM (diabetes mellitus)  Assessment and Plan of Treatment: You can resume your home regimen of diabetic medications. Please follow up with your PCP in one week.   Make sure you get your HgA1c checked every three months.  It's important not to skip any meals.  Keep a list of your current medications including injectables and over the counter medications and bring this medication list with you to all your doctor appointments.  If you have not seen your ophthalmologist this year call for appointment.  Check your feet daily for redness, sores, or openings. Do not self treat. If no improvement in two days call your primary care physician for an appointment.      Diagnosis: HLD (hyperlipidemia)  Assessment and Plan of Treatment: Continue to take your statin as prescribed and follow the DASH diet    Diagnosis: Myocardial infarction  Assessment and Plan of Treatment: Continue with your Aspirin and Plavix as prescribed.  Seek immediate medical intervention in the event you experience chest pain, dizziness, SOB or palpitations.    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 Please follow-up with Endocrinology to monitor this nodule    Diagnosis: Unsteady gait when walking  Assessment and Plan of Treatment:      PRINCIPAL DISCHARGE DIAGNOSIS  Diagnosis: Patella fracture  Assessment and Plan of Treatment: You presented to ER after a fall. You were found to have a left patella fracture. You were evaluted by Orthopedics and had surgery on 10/9/23.  keep LLE locked in extension in vivek brace at all times  Pain medications as needed  Bowel regimen to avoid constipation  continue to use your Incentive Spirometer  follow up with Dr. Chandra in one week at [address] [facility]. Please call [phone]        SECONDARY DISCHARGE DIAGNOSES  Diagnosis: HTN (hypertension)  Assessment and Plan of Treatment: Continue to take your home blood pressure medications as prescribed.  Follow-up with your PCP for regular monitoring of your blood pressure    Diagnosis: DM (diabetes mellitus)  Assessment and Plan of Treatment: You can resume your home regimen of diabetic medications. Please follow up with your PCP in one week.   Make sure you get your HgA1c checked every three months.  It's important not to skip any meals.  Keep a list of your current medications including injectables and over the counter medications and bring this medication list with you to all your doctor appointments.  If you have not seen your ophthalmologist this year call for appointment.  Check your feet daily for redness, sores, or openings. Do not self treat. If no improvement in two days call your primary care physician for an appointment.      Diagnosis: HLD (hyperlipidemia)  Assessment and Plan of Treatment: Continue to take your statin as prescribed and follow the DASH diet    Diagnosis: Myocardial infarction  Assessment and Plan of Treatment: Continue with your Aspirin and Plavix as prescribed.  Seek immediate medical intervention in the event you experience chest pain, dizziness, SOB or palpitations.    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 Please follow-up with Endocrinology to monitor this nodule    Diagnosis: Unsteady gait when walking  Assessment and Plan of Treatment:

## 2023-10-10 NOTE — DISCHARGE NOTE NURSING/CASE MANAGEMENT/SOCIAL WORK - NSDCPEWEB_GEN_ALL_CORE
[facility] for Tobacco Control website --- [email or web address]/[place] website --- www.[facility] CenterBag [facility] Icefrannette.com [facility] for Tobacco Control website --- http://[facility]/quitsmoking/[place] website --- www.[facility] HospitalDelectablefrannette.com [facility] for Tobacco Control website --- http://[facility]/quitsmoking/[place] website --- www.[facility] Center[facility] Conceptionfrannette.com

## 2023-10-10 NOTE — DISCHARGE NOTE NURSING/CASE MANAGEMENT/SOCIAL WORK - NSDCPEHOTLINE_GEN_ALL_CORE
[facility] Smokers Quitline 2-319-BTCOIPE ([phone]) [facility] Smokers Quitline 7-042-DBFGCBH ([phone]) [facility] Smokers Quitline 9-961-UYACPEU ([phone])

## 2023-10-10 NOTE — PROGRESS NOTE ADULT - SUBJECTIVE AND OBJECTIVE BOX
62yFemale    Diagnosis:  S/p ORIF of L Patella POD# 1    Patient was seen and evaluated at bedside. Patient with no acute complaints.   Pain is  well controlled to the LLE.   Denies CP/SOB, dyspnea, paresthesias, N/V/D, palpitations.     Vital Signs Last 24 Hrs  T(C): 36.6 (10 Oct 2023 05:29), Max: 36.8 (09 Oct 2023 13:53)  T(F): 97.9 (10 Oct 2023 05:29), Max: 98.2 (09 Oct 2023 13:53)  HR: 77 (10 Oct 2023 05:29) (66 - 85)  BP: 175/81 (10 Oct 2023 05:29) (122/74 - 175/81)  BP(mean): 103 (10 Oct 2023 00:18) (88 - 124)  RR: 17 (10 Oct 2023 05:29) (12 - 18)  SpO2: 95% (10 Oct 2023 05:29) (94% - 100%)    Parameters below as of 10 Oct 2023 05:29  Patient On (Oxygen Delivery Method): room air      I&O's Detail    09 Oct 2023 07:01  -  10 Oct 2023 07:00  --------------------------------------------------------  IN:  Total IN: 0 mL    OUT:    Voided (mL): 700 mL  Total OUT: 700 mL    Total NET: -700 mL    Physical Exam:    General: AAOx3, NAD, resting comfortably in bed.    L KNEE: Jey brace is locked in extension, Dressing is C/D/I. Skin is pink and warm. No erythema. SILT.  Wound with no drainage, healing well.   Lower extremity:  No calf tenderness, calves are soft. 2+pulses. NVI. (+)EHL/FHL/ADF/APF.  Good capillary refill.                           12.7   8.78  )-----------( 293      ( 10 Oct 2023 04:56 )             39.5     10-10    138  |  108  |  17  ----------------------------<  134<H>  4.3   |  24  |  1.11    Ca    8.9      10 Oct 2023 04:56  Phos  3.6     10-10  Mg     2.0     10-10    TPro  7.0  /  Alb  3.2<L>  /  TBili  0.7  /  DBili  x   /  AST  18  /  ALT  25  /  AlkPhos  102  10-10      Impression:  63 y/o Female S/p ORIF of L patella POD#1  Plan:  -  Pain control  -  DVT prophylaxis  -  Daily Physical Therapy:  WBAT to RLE- keep LLE locked in extension in jey brace at all times  -  Discharge planning: Home Vs. Rehab pending Physical therapy eval   -  Incentive Spirometer  -  Continue with Post-op Antibiotics x 24hrs  -  Case d/w Dr. Chandra and medical team

## 2023-10-10 NOTE — DISCHARGE NOTE PROVIDER - NSDCMRMEDTOKEN_GEN_ALL_CORE_FT
ASPIRIN 81MG EC LOW DOSE TABLETS: TAKE 1 TABLET BY MOUTH EVERY DAY  ATORVASTATIN 80MG TAB:   BISACODYL 5MG EC TABLETS: TAKE 1 TABLET BY MOUTH EVERY DAY AS NEEDED FOR CONSITPATION  CLOPIDOGREL 75MG TABLETS: TAKE 1 TABLET BY MOUTH DAILY FOR ANTICOAGULANT THERAPY  JARDIANCE 10MG TABLETS: TAKE 1 TABLET BY MOUTH EVERY DAY  METFORMIN 1000MG TABLETS: TAKE 1 TABLET BY MOUTH TWICE DAILY  METOPROL SUC 100MG ER TAB:   oxyCODONE 5 mg oral tablet: 1 tab(s) orally every 6 hours as needed for Severe Pain (7 - 10) MDD: 20 mg  RAMIPRIL 10MG CAP: 1 tab daily  REPAGLINIDE 1MG TABLETS: TAKE 1 TABLET BY MOUTH THREE TIMES DAILY BEFORE MEALS. TAKE 1/2 TABLET WITH SMALLER MEALS

## 2023-10-10 NOTE — PROGRESS NOTE ADULT - SUBJECTIVE AND OBJECTIVE BOX
NP Note discussed with  Primary Attending    Patient is a 62y old  Female who presents with a chief complaint of Fractured left patella s/p accidental fall (09 Oct 2023 13:25)      INTERVAL HPI/OVERNIGHT EVENTS: no new complaints, seen lying in the bed in NAD. c/o no BM x past 4 days. Denies abdominal pain. No nausea or vomiting. No chest pain, dizziness, SOB or palpitations.     MEDICATIONS  (STANDING):  aspirin enteric coated 81 milliGRAM(s) Oral daily  atorvastatin 80 milliGRAM(s) Oral at bedtime  clopidogrel Tablet 75 milliGRAM(s) Oral daily  enoxaparin Injectable 40 milliGRAM(s) SubCutaneous every 24 hours  influenza   Vaccine 0.5 milliLiter(s) IntraMuscular once  insulin lispro (ADMELOG) corrective regimen sliding scale   SubCutaneous at bedtime  insulin lispro (ADMELOG) corrective regimen sliding scale   SubCutaneous three times a day before meals  lisinopril 40 milliGRAM(s) Oral daily  metoprolol succinate  milliGRAM(s) Oral daily  polyethylene glycol 3350 17 Gram(s) Oral daily  senna 2 Tablet(s) Oral at bedtime    MEDICATIONS  (PRN):  acetaminophen     Tablet .. 650 milliGRAM(s) Oral every 6 hours PRN Mild Pain (1 - 3)  ondansetron Injectable 4 milliGRAM(s) IV Push every 6 hours PRN Nausea  oxyCODONE    IR 5 milliGRAM(s) Oral every 4 hours PRN Severe Pain (7 - 10)  traMADol 50 milliGRAM(s) Oral every 8 hours PRN Moderate Pain (4 - 6)      __________________________________________________  REVIEW OF SYSTEMS:    CONSTITUTIONAL: No fever,   EYES: no acute visual disturbances  NECK: No pain or stiffness  RESPIRATORY: No cough; No shortness of breath  CARDIOVASCULAR: No chest pain, no palpitations  GASTROINTESTINAL: No pain. No nausea or vomiting; No diarrhea   NEUROLOGICAL: No headache or numbness, no tremors  MUSCULOSKELETAL: No joint pain, no muscle pain  GENITOURINARY: no dysuria, no frequency, no hesitancy  PSYCHIATRY: no depression , no anxiety  ALL OTHER  ROS negative        Vital Signs Last 24 Hrs  T(C): 36.6 (10 Oct 2023 05:29), Max: 36.8 (09 Oct 2023 13:53)  T(F): 97.9 (10 Oct 2023 05:29), Max: 98.2 (09 Oct 2023 13:53)  HR: 77 (10 Oct 2023 05:29) (66 - 85)  BP: 175/81 (10 Oct 2023 05:29) (122/74 - 175/81)  BP(mean): 103 (10 Oct 2023 00:18) (88 - 124)  RR: 17 (10 Oct 2023 05:29) (12 - 18)  SpO2: 95% (10 Oct 2023 05:29) (94% - 100%)    Parameters below as of 10 Oct 2023 05:29  Patient On (Oxygen Delivery Method): room air        ________________________________________________  PHYSICAL EXAM:  GENERAL: NAD  HEENT: Normocephalic;  conjunctivae and sclerae clear; moist mucous membranes;   NECK : supple  CHEST/LUNG: Clear to auscultation bilaterally with good air entry   HEART: S1 S2  regular; no murmurs, gallops or rubs  ABDOMEN: Soft, Nontender, Nondistended; Bowel sounds present  EXTREMITIES: no cyanosis; no edema; no calf tenderness  SKIN: warm and dry; no rash  NERVOUS SYSTEM:  Awake and alert; Oriented  to place, person and time ; no new deficits    _________________________________________________  LABS:                        12.7   8.78  )-----------( 293      ( 10 Oct 2023 04:56 )             39.5     10-10    138  |  108  |  17  ----------------------------<  134<H>  4.3   |  24  |  1.11    Ca    8.9      10 Oct 2023 04:56  Phos  3.6     10-10  Mg     2.0     10-10    TPro  7.0  /  Alb  3.2<L>  /  TBili  0.7  /  DBili  x   /  AST  18  /  ALT  25  /  AlkPhos  102  10-10    PT/INR - ( 09 Oct 2023 07:41 )   PT: 10.3 sec;   INR: 0.90 ratio         PTT - ( 09 Oct 2023 07:41 )  PTT:31.8 sec  Urinalysis Basic - ( 10 Oct 2023 04:56 )    Color: x / Appearance: x / SG: x / pH: x  Gluc: 134 mg/dL / Ketone: x  / Bili: x / Urobili: x   Blood: x / Protein: x / Nitrite: x   Leuk Esterase: x / RBC: x / WBC x   Sq Epi: x / Non Sq Epi: x / Bacteria: x      CAPILLARY BLOOD GLUCOSE      POCT Blood Glucose.: 160 mg/dL (10 Oct 2023 07:46)  POCT Blood Glucose.: 191 mg/dL (09 Oct 2023 23:44)  POCT Blood Glucose.: 145 mg/dL (09 Oct 2023 17:58)  POCT Blood Glucose.: 141 mg/dL (09 Oct 2023 14:45)        RADIOLOGY & ADDITIONAL TESTS:    Imaging Personally Reviewed:  YES/NO    Consultant(s) Notes Reviewed:   YES/ No    Care Discussed with Consultants :     Plan of care was discussed with patient and /or primary care giver; all questions and concerns were addressed and care was aligned with patient's wishes.

## 2023-10-10 NOTE — PROGRESS NOTE ADULT - NS ATTEND AMEND GEN_ALL_CORE FT
I have seen and evaluated the patient at the bedside. She says she is doing ok, just having a lot of pain in the area of her surgery on the patella, requiring narcotic pain medication. No fever, chills, HA, chest pain or dyspnea.    PT evaluated the patient and recommended home health PT.      On exam, patient is laying in bed and appears mildly uncomfortable but in no acute distress. Afebrile and hemodynamically stable with high BP of 175/81 this AM. Cardiopulmonary exam unremarkable. Patient’s left leg wrapped in ACE bandage and in immobilizer.      I have reviewed CBC, BMP, LFTs. Her CBC is within normal limits. Her renal function is normal and LFTs are within normal limits.      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      #Left Patellar Fracture s/p ORIF POD# 0   #Acute Fracture pain   #HTN    #Thyroid nodule   #Hx CAD s/p stent   #Type 2 DM    #Hx Right eye retinal detachment     -PT recommending Home PT; will work to discharge once pain is adequately controlled   -re HTN: would next add calcium channel blocker if persistently elevated, but feel pain is likely driving this   -prn oxycodone 5 mg for analgesia    -bowel regimen   -daily PT as tolerated while in house   -anticipate DC in PM on 10/10 or 10/11, again depending on pain control   -f/u with Endocrinology in outpatient setting re: thyroid nodule    - Ortho f/u; can continue on ASA for DVT ppx as being d/c home; able to ambulate' d/w pt I have seen and evaluated the patient at the bedside. She says she is doing ok, just having a lot of pain in the area of her surgery on the patella, requiring narcotic pain medication. No fever, chills, HA, chest pain or dyspnea.    PT evaluated the patient and recommended home health PT.      On exam, patient is laying in bed and appears mildly uncomfortable but in no acute distress. Afebrile and hemodynamically stable with high BP of 175/81 this AM. Cardiopulmonary exam unremarkable. Patient’s left leg wrapped in ACE bandage and in immobilizer.      I have reviewed CBC, BMP, LFTs. Her CBC is within normal limits. Her renal function is normal and LFTs are within normal limits.      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      #Left Patellar Fracture s/p ORIF POD# 0   #Acute Fracture pain   #HTN    #Thyroid nodule   #Hx CAD s/p stent   #Type 2 DM    #Hx Right eye retinal detachment     -PT recommending Home PT; will work to discharge once pain is adequately controlled   -re HTN: would next add calcium channel blocker if persistently elevated, but feel pain is likely driving this   -prn oxycodone 5 mg for analgesia    -bowel regimen   -daily PT as tolerated while in house   -anticipate DC in PM on 10/10 or 10/11, again depending on pain control   -f/u with Endocrinology in outpatient setting re: thyroid nodule    - Ortho f/u; can continue on ASA for DVT ppx as being d/c home; able to ambulate' d/w pt I have seen and evaluated the patient at the bedside. She says she is doing ok, just having a lot of pain in the area of her surgery on the patella, requiring narcotic pain medication. No fever, chills, HA, chest pain or dyspnea.    PT evaluated the patient and recommended home health PT.      On exam, patient is laying in bed and appears mildly uncomfortable but in no acute distress. Afebrile and hemodynamically stable with high BP of 175/81 this AM. Cardiopulmonary exam unremarkable. Patient’s left leg wrapped in ACE bandage and in immobilizer.      I have reviewed CBC, BMP, LFTs. Her CBC is within normal limits. Her renal function is normal and LFTs are within normal limits.      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      #Left Patellar Fracture s/p ORIF POD# 0   #Acute Fracture pain   #HTN    #Thyroid nodule   #Hx CAD s/p stent   #Type 2 DM    #Hx Right eye retinal detachment     -PT recommending Home PT; will work to discharge once pain is adequately controlled   -re HTN: would next add calcium channel blocker if persistently elevated, but feel pain is likely driving this   -prn oxycodone 5 mg for analgesia    -bowel regimen   -daily PT as tolerated while in house   -anticipate DC in PM on 10/10 or 10/11, again depending on pain control   -f/u with Endocrinology in outpatient setting re: thyroid nodule    - Ortho f/u; can continue on ASA for DVT ppx as being d/c home; able to ambulate' d/w pt

## 2023-10-10 NOTE — DISCHARGE NOTE PROVIDER - HOSPITAL COURSE
62-year-old female from home ambulates with no assistance at baseline. 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 Pt is admitted for patella fracture and is now s/p ORIF left patella on 10/9/23. She was evaluated by physical therapy and medically cleared for discharged home with home PT.   62-year-old female from home ambulates with no assistance at baseline. 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 Pt is admitted for patella fracture and is now s/p ORIF left patella on 10/9/23. She was evaluated by physical therapy and medically cleared for discharged home with home PT.    Attending Attestation:    Agree with above. Patient will be discharged with short (3-5 day) supply of oxycodone to take as needed for her fracture/surgery-related pain. She will go home with home PT.

## 2023-10-10 NOTE — PHYSICAL THERAPY INITIAL EVALUATION ADULT - GENERAL OBSERVATIONS, REHAB EVAL
awake, alert, NAD; + peripheral IV access on right forearm; + wound dressing, ACE wrap, and vivek brace locked in full knee extension on left LE

## 2023-10-10 NOTE — DISCHARGE NOTE PROVIDER - PROVIDER TOKENS
PROVIDER:[TOKEN:[[id number]],FOLLOWUP:[1 week]],PROVIDER:[TOKEN:[[id number]],FOLLOWUP:[2 weeks]] PROVIDER:[TOKEN:[[id number]],FOLLOWUP:[1 week]],PROVIDER:[TOKEN:[[id number]],FOLLOWUP:[2 weeks]] PROVIDER:[TOKEN:[[id number]],FOLLOWUP:[1 week]],PROVIDER:[TOKEN:[[id number]],FOLLOWUP:[2 weeks]]

## 2023-10-10 NOTE — PHYSICAL THERAPY INITIAL EVALUATION ADULT - PERSONAL SAFETY AND JUDGMENT, REHAB EVAL
patient is visually impaired (blind on right eye, blurry vision on left eye); at risk for fall; patient "missed a step" causing her to fall/intact

## 2023-10-10 NOTE — PHYSICAL THERAPY INITIAL EVALUATION ADULT - ADDITIONAL COMMENTS
no assistive device used; recently used bilateral axillary crutches, NWB on left LE, for transfers and ambulation s/p fall

## 2023-10-10 NOTE — PHYSICAL THERAPY INITIAL EVALUATION ADULT - PERTINENT HX OF CURRENT PROBLEM, REHAB EVAL
admitted for left knee pain s/p fall; left patella fracture; s/p ORIF of left patella POD#1  h/o DM, HTN, HLD, MI (2015, on plavix s/p stenting); right eye retinal detachment (blind on right eye)

## 2023-10-10 NOTE — DISCHARGE NOTE NURSING/CASE MANAGEMENT/SOCIAL WORK - NSDCPEFALRISK_GEN_ALL_CORE
For information on Fall & Injury Prevention, visit: [email or web address]://www.[email or web address]/[email or web address]-[email or web address]-and-maintains-health-and-mobility OR  https://www.[email or web address]/news/fall-prevention-tips-[email or web address]-injury OR  https://www.cdc.gov/steadi/patient.html For information on Fall & Injury Prevention, visit: https://www.[facility]/news/fall-prevention-protects-and-maintains-health-and-mobility OR  https://www.[facility]/news/fall-prevention-tips-to-avoid-injury OR  https://www.cdc.gov/steadi/patient.html For information on Fall & Injury Prevention, visit: https://www.[facility]/news/fall-prevention-protects-and-maintains-health-and-mobility OR  https://www.[facility]/news/fall-prevention-tips-to-avoid-injury OR  https://www.cdc.gov/steadi/patient.html

## 2023-10-10 NOTE — PROGRESS NOTE ADULT - ASSESSMENT
Confidential Drug Utilization Report  Search Terms: Molly Morrison, 1960Search Date: 10/08/2023 09:00:37 AM  The Drug Utilization Report below displays all of the controlled substance prescriptions, if any, that your patient has filled in the last twelve months. The information displayed on this report is compiled from pharmacy submissions to the Department, and accurately reflects the information as submitted by the pharmacies.    This report was requested by: Jolynn Steward | Reference #: [id number]    There are no results for the search terms that you entered. Confidential Drug Utilization Report  Search Terms: Molly Morrison, 1960Search Date: 10/08/2023 09:00:37 AM  The Drug Utilization Report below displays all of the controlled substance prescriptions, if any, that your patient has filled in the last twelve months. The information displayed on this report is compiled from pharmacy submissions to the Department, and accurately reflects the information as submitted by the pharmacies.    This report was requested by: Jolynn Steward | Reference #: [id number]    There are no results for the search terms that you entered. Confidential Drug Utilization Report  Search Terms: Molly Morrison, 1960Search Date: 10/08/2023 09:00:37 AM  The Drug Utilization Report below displays all of the controlled substance prescriptions, if any, that your patient has filled in the last twelve months. The information displayed on this report is compiled from pharmacy submissions to the Department, and accurately reflects the information as submitted by the pharmacies.    This report was requested by: Jolynn Steward | Reference #: [id number]    There are no results for the search terms that you entered.

## 2023-10-10 NOTE — PHYSICAL THERAPY INITIAL EVALUATION ADULT - ACTIVE RANGE OF MOTION EXAMINATION, REHAB EVAL
left knee with vivek brace in place, locked in full knee extension; hip flexion slightly limited to <90 degrees with left knee in full extension/bilateral upper extremity Active ROM was WFL (within functional limits)/bilateral  lower extremity Active ROM was WFL (within functional limits)

## 2023-10-10 NOTE — PHYSICAL THERAPY INITIAL EVALUATION ADULT - PASSIVE RANGE OF MOTION EXAMINATION, REHAB EVAL
left knee with vivek brace in place, locked in full knee extension; hip flexion slightly limited to <90 degrees with left knee in full extension/bilateral upper extremity Passive ROM was WFL (within functional limits)/bilateral lower extremity Passive ROM was WFL (within functional limits)

## 2023-10-10 NOTE — PHYSICAL THERAPY INITIAL EVALUATION ADULT - LIVES WITH, PROFILE
mother, also currently with son; in a private house with 5/6 steps to get inside from the street, and approximately 13 steps inside the house to get to the bedroom

## 2023-10-10 NOTE — PHYSICAL THERAPY INITIAL EVALUATION ADULT - DIAGNOSIS, PT EVAL
s/p fall, left patellar fracture; s/p ORIF of left patella POD#1; Enforced limitation of motion on left knee with potential for weakness and contracture; pain, difficulty performing bed mobility, transfers, and ambulation; gait disturbance

## 2023-10-10 NOTE — PHYSICAL THERAPY INITIAL EVALUATION ADULT - NS ASR WT BEARING DETAIL LLE
with [place] brace locked in full knee extension/weight-bearing as tolerated with [place] brace locked in full knee extension/weight-bearing as tolerated with [place] brace locked in full knee extension/weight-bearing as tolerated

## 2023-10-10 NOTE — DISCHARGE NOTE PROVIDER - CARE PROVIDER_API CALL
José Chandra  Orthopaedic Surgery  Follow Up Time: 1 week    Aicha Payan  Endocrinology/Metab/Diabetes  [address], [address]  [place] [address]  Phone: [phone]  Fax: [phone]  Follow Up Time: 2 weeks   José Chandra  Orthopaedic Surgery  Follow Up Time: 1 week    Aicha Payan  Endocrinology/Metab/Diabetes  [address], [address]  [place] [address]  Phone: [phone]  Fax: [phone]  Follow Up Time: 2 weeks   José Chandra  Orthopaedic Surgery  Follow Up Time: 1 week    Aicha Payan  Endocrinology/Metab/Diabetes  [address], [address]  [place] [address]  Phone: [phone]  Fax: [phone]  Follow Up Time: 2 weeks

## 2023-10-10 NOTE — PROGRESS NOTE ADULT - PROBLEM SELECTOR PLAN 1
Continue with knee immobilizer to LLE   Pain control  bowel regimen   fall risk precautions  f/u PT eval  Ortho following

## 2023-10-10 NOTE — PROGRESS NOTE ADULT - PROBLEM SELECTOR PLAN 2
CT cervical spine showed 1.3 cm hypodense left thyroid nodule.   Recommend follow-up thyroid ultrasound  outpatient f/u
c/w home meds with holding parameters.  monitor BP  pain control

## 2023-10-10 NOTE — PROGRESS NOTE ADULT - ASSESSMENT
62-year-old female from home ambulates with no assistance at baseline. 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 Pt is admitted for patella fracture and is now s/pORIF left patella 10/9/23.

## 2023-10-10 NOTE — PROGRESS NOTE ADULT - PROBLEM SELECTOR PLAN 1
Pt with acute left knee pain which is somatic in nature due to left patellar fracture. Patient is status post Left Patellar Fracture ORIF on 10/9 with Dr. Chandra now, POD #1. LLE [place] brace locked in extension.   Opioid pain recommendations   -  Oxycodone 5 mg PO q 4 hours PRN severe pain. Monitor for sedation/ respiratory depression.   Non-opioid pain recommendations   - Discontinue Tramadol 50mg, patient not using  - Continue Acetaminophen 1 gram PO q 8 hours x 3 days. (10/13). Monitor LFTs  - Avoid NSAIDs- hx MI.   Bowel Regimen  - Continue Miralax 17G PO daily  - Continue Senna 2 tablets at bedtime for constipation  Mild pain (score 1-3)  - Non-pharmacological pain treatment recommendations  - Warm/ Cool packs PRN   - Repositioning extremity, elevation, imagery, relaxation, distraction.  - Physical therapy OOB if no contraindications   Recommendations discussed with primary team and RN. Pt with acute left knee pain which is somatic in nature due to left patellar fracture. Patient is status post Left Patellar Fracture ORIF on 10/9 with Dr. Chandra now, POD #1. LLE [place] brace locked in extension.   Opioid pain recommendations   -  Oxycodone 5 mg PO q 4 hours PRN severe pain. Monitor for sedation/ respiratory depression.   Non-opioid pain recommendations   - Discontinue Tramadol 50mg, patient not using  - Continue Acetaminophen 1 gram PO q 8 hours x 3 days. (10/13). Monitor LFTs  - Avoid NSAIDs- hx MI.   Bowel Regimen  - Continue Miralax 17G PO daily  - Continue Senna 2 tablets at bedtime for constipation  Mild pain (score 1-3)  - Non-pharmacological pain treatment recommendations  - Warm/ Cool packs PRN   - Repositioning extremity, elevation, imagery, relaxation, distraction.  - Physical therapy OOB if no contraindications   Recommendations discussed with primary team and RN. Pt with acute left knee pain which is somatic in nature due to left patellar fracture. Patient is status post Left Patellar Fracture ORIF on 10/9 with Dr. Chandra now, POD #1. LLE [place] brace locked in extension.   Opioid pain recommendations   -  Oxycodone 5 mg PO q 4 hours PRN severe pain. Monitor for sedation/ respiratory depression.   Non-opioid pain recommendations   - Discontinue Tramadol 50mg, patient not using  - Continue Acetaminophen 1 gram PO q 8 hours x 3 days. (10/13). Monitor LFTs  - Avoid NSAIDs- hx MI.   Bowel Regimen  - Continue Miralax 17G PO daily  - Continue Senna 2 tablets at bedtime for constipation  Mild pain (score 1-3)  - Non-pharmacological pain treatment recommendations  - Warm/ Cool packs PRN   - Repositioning extremity, elevation, imagery, relaxation, distraction.  - Physical therapy OOB if no contraindications   Recommendations discussed with primary team and RN.

## 2023-10-10 NOTE — DISCHARGE NOTE PROVIDER - ATTENDING DISCHARGE PHYSICAL EXAMINATION:
GENERAL: NAD, well-developed  HEAD:  Atraumatic, Normocephalic  EYES: EOMI, PERRLA, conjunctiva and sclera clear  NECK: Supple, No JVD  CHEST/LUNG: Clear to auscultation bilaterally; No wheeze  HEART: Regular rate and rhythm; No murmurs, rubs, or gallops  ABDOMEN: Soft, Nontender, Nondistended; Bowel sounds present  EXTREMITIES:  2+ Peripheral Pulses, Left leg is ACE wrapped and in an immobilizer   PSYCH: AAOx3  NEUROLOGY: non-focal  SKIN: No rashes or lesions

## 2023-10-10 NOTE — PROGRESS NOTE ADULT - SUBJECTIVE AND OBJECTIVE BOX
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  Patient states she was walking and missed a step and she fell on her L knee.  She complains of left knee pain, right ankle plain.  She denies hitting her head.  She states that pain is so severe she is unable to ambulate. Patient's knee is mildly swollen on examination unable to bend the knee. X-ray.shows patellar fracture. Pt denies numbness or tingling, weakness, palpitation, CP, SOB, dizziness, HA. (07 Oct 2023 12:47)    Pt is admitted for left patellar fx. CT head- No evidence of acute trauma. Indeterminate ovoid densities within the right globe, possibly iatrogenic in etiology. Clinical correlation is recommended with further workup as warranted.1.3 cm hypodense left thyroid nodule. Recommend correlation with nonemergent follow-up thyroid ultrasound.    Pain consulted for left knee pain on 10/7. Patient is status post Left Patellar Fracture ORIF on 10/9 with Dr. Chandra now, POD #1.  Pt seen and examined at bedside, this morning. Reports left knee pain score 9/10  SCALE USED: (1-10 VNRS). Pt recently medicated with PRN oxycodone. Patient describes pain as localized to left knee cap, aching, throbbing, non- radiating in quality. The pain is alleviated by pain medication and exacerbated by movement. Left knee vivek brace locked in extension.  Pt tolerating PO diet. Denies lethargy, chest pain, SOB, nausea, vomiting, constipation. Reports last BM 10/6. Patient stated goal for pain control: to be able to take deep breaths, get out of bed to chair and ambulate with tolerable pain control. Pt denies taking medications for pain at home.     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 (07 Oct 2023 12:47)  [X ] Denies ETOH use, illicit drug use and smoking    Allergies    No Known Allergies    MEDICATIONS  (STANDING):  acetaminophen     Tablet .. 1000 milliGRAM(s) Oral every 8 hours  aspirin enteric coated 81 milliGRAM(s) Oral daily  atorvastatin 80 milliGRAM(s) Oral at bedtime  clopidogrel Tablet 75 milliGRAM(s) Oral daily  enoxaparin Injectable 40 milliGRAM(s) SubCutaneous every 24 hours  influenza   Vaccine 0.5 milliLiter(s) IntraMuscular once  insulin lispro (ADMELOG) corrective regimen sliding scale   SubCutaneous at bedtime  insulin lispro (ADMELOG) corrective regimen sliding scale   SubCutaneous three times a day before meals  lisinopril 40 milliGRAM(s) Oral daily  metoprolol succinate  milliGRAM(s) Oral daily  polyethylene glycol 3350 17 Gram(s) Oral daily  senna 2 Tablet(s) Oral at bedtime    MEDICATIONS  (PRN):  ondansetron Injectable 4 milliGRAM(s) IV Push every 6 hours PRN Nausea  oxyCODONE    IR 5 milliGRAM(s) Oral every 4 hours PRN Severe Pain (7 - 10)    Vital Signs Last 24 Hrs  T(C): 36.6 (10 Oct 2023 05:29), Max: 36.6 (09 Oct 2023 14:33)  T(F): 97.9 (10 Oct 2023 05:29), Max: 97.9 (09 Oct 2023 14:33)  HR: 78 (10 Oct 2023 11:50) (66 - 85)  BP: 159/70 (10 Oct 2023 11:50) (122/74 - 175/81)  BP(mean): 100 (10 Oct 2023 11:50) (88 - 124)  RR: 18 (10 Oct 2023 11:50) (12 - 18)  SpO2: 96% (10 Oct 2023 11:50) (94% - 100%)    Parameters below as of 10 Oct 2023 11:50  Patient On (Oxygen Delivery Method): room air    LABS: Reviewed                          12.7   8.78  )-----------( 293      ( 10 Oct 2023 04:56 )             39.5     10-10    138  |  108  |  17  ----------------------------<  134<H>  4.3   |  24  |  1.11    Ca    8.9      10 Oct 2023 04:56  Phos  3.6     10-10  Mg     2.0     10-10    TPro  7.0  /  Alb  3.2<L>  /  TBili  0.7  /  DBili  x   /  AST  18  /  ALT  25  /  AlkPhos  102  10-10    PT/INR - ( 09 Oct 2023 07:41 )   PT: 10.3 sec;   INR: 0.90 ratio      PTT - ( 09 Oct 2023 07:41 )  PTT:31.8 sec  LIVER FUNCTIONS - ( 10 Oct 2023 04:56 )  Alb: 3.2 g/dL / Pro: 7.0 g/dL / ALK PHOS: 102 U/L / ALT: 25 U/L DA / AST: 18 U/L / GGT: x           Urinalysis Basic - ( 10 Oct 2023 04:56 )    Color: x / Appearance: x / SG: x / pH: x  Gluc: 134 mg/dL / Ketone: x  / Bili: x / Urobili: x   Blood: x / Protein: x / Nitrite: x   Leuk Esterase: x / RBC: x / WBC x   Sq Epi: x / Non Sq Epi: x / Bacteria: x    CAPILLARY BLOOD GLUCOSE    POCT Blood Glucose.: 137 mg/dL (10 Oct 2023 11:25)  POCT Blood Glucose.: 160 mg/dL (10 Oct 2023 07:46)  POCT Blood Glucose.: 191 mg/dL (09 Oct 2023 23:44)  POCT Blood Glucose.: 145 mg/dL (09 Oct 2023 17:58)  POCT Blood Glucose.: 141 mg/dL (09 Oct 2023 14:45)    Radiology: Reviewed.   < from: CT Head No Cont (10.07.23 @ 03:01) >    ACC: [id number] EXAM:  CT BRAIN   ORDERED BY: ANASTACIA HURTADO     ACC: [id number] EXAM:  CT CERVICAL SPINE   ORDERED BY: ANASTACIA HURTADO     PROCEDURE DATE:  10/07/2023      INTERPRETATION:  Clinical Indication: Status post fall.    Comparison: None    Technique: Noncontrast axial CT images of the head and cervical spine   were obtained. Coronal and sagittal reconstructions were performed.    Head CT Findings:  The ventricles and sulci are normal in size for the patient's stated age.    No acute intracranial hemorrhage is identified.  There is no extra-axial   fluid collection. No mass effect or midline shift is seen.  There is no   evidence of acute territorial infarct.  There are periventricular and   subcortical white matter hypodensities, which are nonspecific, but likely   reflect mild microvascular ischemic disease.  There is bilateral maxillary sinus mucosal thickening with associated   near complete opacification on the right and partial desiccation left.   There is mild sphenoid and ethmoid sinus mucosal thickening. The mastoid   air cells are well aerated.  No acute osseous abnormality is seen.   Nonspecific high right 1.2 cm scalp nodule is noted.  Two ovoid densities are noted within the right globe, largest measuring   up to 1.3 cm, indeterminate, possibly iatrogenic in etiology.    Cervical Spine CT Findings:  Straightening of usual cervical lordosis is likely related to muscle   spasm or positioning.  There is no spondylolisthesis.  There is no acute   displaced fracture.  There is no prevertebral soft tissue swelling.  The   vertebral body heights are maintained. 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 There are multilevel degenerative changes   characterized by disc osteophyte complexesand facet and uncinate   hypertrophy. This results in mild canal stenosis and multilevel neural   foraminal narrowing. 1.3 cm hypodense left thyroid nodule.    Impression:  No evidence of acute trauma.    Indeterminate ovoid densities within the right globe, possibly iatrogenic   in etiology. Clinical correlation is recommended with further workup as   warranted.    1.3 cm hypodense left thyroid nodule. Recommend correlation with   nonemergent follow-up thyroid ultrasound.    --- End of Report ---    DAVID ESPITIA MD; Attending Radiologist  This document has been electronically signed. Oct  7 2023  4:16AM    < end of copied text >    ORT Score -   Family Hx of substance abuse	Female	      Male  Alcohol 	                                           1                     3  Illegal drugs	                                   2                     3  Rx drugs                                           4 	                  4  Personal Hx of substance abuse		  Alcohol 	                                          3	                  3  Illegal drugs                                     4	                  4  Rx drugs                                            5 	                  5  Age between 16- 45 years	           1                     1  hx preadolescent sexual abuse	   3 	                  0  Psychological disease		  ADD, OCD, bipolar, schizophrenia   2	          2  Depression                                           1 	          1  Total: 0    a score of 3 or lower indicates low risk for opioid abuse		  a score of 4-7 indicates moderate risk for opioid abuse		  a score of 8 or higher indicates high risk for opioid abuse    REVIEW OF SYSTEMS:  CONSTITUTIONAL: No fever or fatigue  HEENT:  No difficulty hearing, no change in vision; + right eye blindness.   NECK: No pain or stiffness  RESPIRATORY: No cough, wheezing, chills or hemoptysis; No shortness of breath  CARDIOVASCULAR: No chest pain, palpitations, dizziness, or leg swelling  GASTROINTESTINAL: No loss of appetite, decreased PO intake. No abdominal or epigastric pain. No nausea, vomiting; No diarrhea or constipation.   GENITOURINARY: No dysuria, frequency, hematuria, retention or incontinence  MUSCULOSKELETAL: + left knee pain. + mild occasional right dorsal foot pain. No joint swelling; no upper or lower motor strength weakness, no saddle anesthesia, bowel/bladder incontinence, no falls   NEURO: No headaches, No numbness/tingling b/l LE, No weakness    PHYSICAL EXAM:  GENERAL:  Alert & Oriented X4, cooperative, NAD, Good concentration. Speech is clear.   RESPIRATORY: Respirations even and unlabored. Clear to auscultation bilaterally  CARDIOVASCULAR: Normal S1/S2, regular rate and rhythm  GASTROINTESTINAL:  Soft, Nontender, Nondistended; Bowel sounds present  PERIPHERAL VASCULAR:  Extremities warm with left knee edema. 2+ Peripheral Pulses, No cyanosis, No calf tenderness  MUSCULOSKELETAL: Motor Strength 5/5 B/L upper and right lower extremities; + left knee vivek brace locked in extension. + decreased left knee ROM; + left knee and right dorsal foot tenderness on palpation.   SKIN: Warm, dry, intact. No rashes, lesions, scars or wounds.     Risk factors associated with adverse outcomes related to opioid treatment  [ ]  Concurrent benzodiazepine use  [ ]  History/ Active substance use or alcohol use disorder  [ ] Psychiatric co-morbidity  [ ] Sleep apnea  [ ] COPD  [ ] BMI> 35  [ ] Liver dysfunction  [ ] Renal dysfunction  [ ] CHF  [ ] Smoker  [X ]  Age > 60 years    [X ]  [place]  Reviewed and Copied to Chart. See below.    Plan of care and goal oriented pain management treatment options were discussed with patient and /or primary care giver; all questions and concerns were addressed and care was aligned with patient's wishes.    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  Patient states she was walking and missed a step and she fell on her L knee.  She complains of left knee pain, right ankle plain.  She denies hitting her head.  She states that pain is so severe she is unable to ambulate. Patient's knee is mildly swollen on examination unable to bend the knee. X-ray.shows patellar fracture. Pt denies numbness or tingling, weakness, palpitation, CP, SOB, dizziness, HA. (07 Oct 2023 12:47)    Pt is admitted for left patellar fx. CT head- No evidence of acute trauma. Indeterminate ovoid densities within the right globe, possibly iatrogenic in etiology. Clinical correlation is recommended with further workup as warranted.1.3 cm hypodense left thyroid nodule. Recommend correlation with nonemergent follow-up thyroid ultrasound.    Pain consulted for left knee pain on 10/7. Patient is status post Left Patellar Fracture ORIF on 10/9 with Dr. Chandra now, POD #1.  Pt seen and examined at bedside, this morning. Reports left knee pain score 9/10  SCALE USED: (1-10 VNRS). Pt recently medicated with PRN oxycodone. Patient describes pain as localized to left knee cap, aching, throbbing, non- radiating in quality. The pain is alleviated by pain medication and exacerbated by movement. Left knee vivek brace locked in extension.  Pt tolerating PO diet. Denies lethargy, chest pain, SOB, nausea, vomiting, constipation. Reports last BM 10/6. Patient stated goal for pain control: to be able to take deep breaths, get out of bed to chair and ambulate with tolerable pain control. Pt denies taking medications for pain at home.     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 (07 Oct 2023 12:47)  [X ] Denies ETOH use, illicit drug use and smoking    Allergies    No Known Allergies    MEDICATIONS  (STANDING):  acetaminophen     Tablet .. 1000 milliGRAM(s) Oral every 8 hours  aspirin enteric coated 81 milliGRAM(s) Oral daily  atorvastatin 80 milliGRAM(s) Oral at bedtime  clopidogrel Tablet 75 milliGRAM(s) Oral daily  enoxaparin Injectable 40 milliGRAM(s) SubCutaneous every 24 hours  influenza   Vaccine 0.5 milliLiter(s) IntraMuscular once  insulin lispro (ADMELOG) corrective regimen sliding scale   SubCutaneous at bedtime  insulin lispro (ADMELOG) corrective regimen sliding scale   SubCutaneous three times a day before meals  lisinopril 40 milliGRAM(s) Oral daily  metoprolol succinate  milliGRAM(s) Oral daily  polyethylene glycol 3350 17 Gram(s) Oral daily  senna 2 Tablet(s) Oral at bedtime    MEDICATIONS  (PRN):  ondansetron Injectable 4 milliGRAM(s) IV Push every 6 hours PRN Nausea  oxyCODONE    IR 5 milliGRAM(s) Oral every 4 hours PRN Severe Pain (7 - 10)    Vital Signs Last 24 Hrs  T(C): 36.6 (10 Oct 2023 05:29), Max: 36.6 (09 Oct 2023 14:33)  T(F): 97.9 (10 Oct 2023 05:29), Max: 97.9 (09 Oct 2023 14:33)  HR: 78 (10 Oct 2023 11:50) (66 - 85)  BP: 159/70 (10 Oct 2023 11:50) (122/74 - 175/81)  BP(mean): 100 (10 Oct 2023 11:50) (88 - 124)  RR: 18 (10 Oct 2023 11:50) (12 - 18)  SpO2: 96% (10 Oct 2023 11:50) (94% - 100%)    Parameters below as of 10 Oct 2023 11:50  Patient On (Oxygen Delivery Method): room air    LABS: Reviewed                          12.7   8.78  )-----------( 293      ( 10 Oct 2023 04:56 )             39.5     10-10    138  |  108  |  17  ----------------------------<  134<H>  4.3   |  24  |  1.11    Ca    8.9      10 Oct 2023 04:56  Phos  3.6     10-10  Mg     2.0     10-10    TPro  7.0  /  Alb  3.2<L>  /  TBili  0.7  /  DBili  x   /  AST  18  /  ALT  25  /  AlkPhos  102  10-10    PT/INR - ( 09 Oct 2023 07:41 )   PT: 10.3 sec;   INR: 0.90 ratio      PTT - ( 09 Oct 2023 07:41 )  PTT:31.8 sec  LIVER FUNCTIONS - ( 10 Oct 2023 04:56 )  Alb: 3.2 g/dL / Pro: 7.0 g/dL / ALK PHOS: 102 U/L / ALT: 25 U/L DA / AST: 18 U/L / GGT: x           Urinalysis Basic - ( 10 Oct 2023 04:56 )    Color: x / Appearance: x / SG: x / pH: x  Gluc: 134 mg/dL / Ketone: x  / Bili: x / Urobili: x   Blood: x / Protein: x / Nitrite: x   Leuk Esterase: x / RBC: x / WBC x   Sq Epi: x / Non Sq Epi: x / Bacteria: x    CAPILLARY BLOOD GLUCOSE    POCT Blood Glucose.: 137 mg/dL (10 Oct 2023 11:25)  POCT Blood Glucose.: 160 mg/dL (10 Oct 2023 07:46)  POCT Blood Glucose.: 191 mg/dL (09 Oct 2023 23:44)  POCT Blood Glucose.: 145 mg/dL (09 Oct 2023 17:58)  POCT Blood Glucose.: 141 mg/dL (09 Oct 2023 14:45)    Radiology: Reviewed.   < from: CT Head No Cont (10.07.23 @ 03:01) >    ACC: [id number] EXAM:  CT BRAIN   ORDERED BY: ANASTACIA HURTADO     ACC: [id number] EXAM:  CT CERVICAL SPINE   ORDERED BY: ANASTACIA HURTADO     PROCEDURE DATE:  10/07/2023      INTERPRETATION:  Clinical Indication: Status post fall.    Comparison: None    Technique: Noncontrast axial CT images of the head and cervical spine   were obtained. Coronal and sagittal reconstructions were performed.    Head CT Findings:  The ventricles and sulci are normal in size for the patient's stated age.    No acute intracranial hemorrhage is identified.  There is no extra-axial   fluid collection. No mass effect or midline shift is seen.  There is no   evidence of acute territorial infarct.  There are periventricular and   subcortical white matter hypodensities, which are nonspecific, but likely   reflect mild microvascular ischemic disease.  There is bilateral maxillary sinus mucosal thickening with associated   near complete opacification on the right and partial desiccation left.   There is mild sphenoid and ethmoid sinus mucosal thickening. The mastoid   air cells are well aerated.  No acute osseous abnormality is seen.   Nonspecific high right 1.2 cm scalp nodule is noted.  Two ovoid densities are noted within the right globe, largest measuring   up to 1.3 cm, indeterminate, possibly iatrogenic in etiology.    Cervical Spine CT Findings:  Straightening of usual cervical lordosis is likely related to muscle   spasm or positioning.  There is no spondylolisthesis.  There is no acute   displaced fracture.  There is no prevertebral soft tissue swelling.  The   vertebral body heights are maintained. 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 There are multilevel degenerative changes   characterized by disc osteophyte complexesand facet and uncinate   hypertrophy. This results in mild canal stenosis and multilevel neural   foraminal narrowing. 1.3 cm hypodense left thyroid nodule.    Impression:  No evidence of acute trauma.    Indeterminate ovoid densities within the right globe, possibly iatrogenic   in etiology. Clinical correlation is recommended with further workup as   warranted.    1.3 cm hypodense left thyroid nodule. Recommend correlation with   nonemergent follow-up thyroid ultrasound.    --- End of Report ---    DAVID ESPITIA MD; Attending Radiologist  This document has been electronically signed. Oct  7 2023  4:16AM    < end of copied text >    ORT Score -   Family Hx of substance abuse	Female	      Male  Alcohol 	                                           1                     3  Illegal drugs	                                   2                     3  Rx drugs                                           4 	                  4  Personal Hx of substance abuse		  Alcohol 	                                          3	                  3  Illegal drugs                                     4	                  4  Rx drugs                                            5 	                  5  Age between 16- 45 years	           1                     1  hx preadolescent sexual abuse	   3 	                  0  Psychological disease		  ADD, OCD, bipolar, schizophrenia   2	          2  Depression                                           1 	          1  Total: 0    a score of 3 or lower indicates low risk for opioid abuse		  a score of 4-7 indicates moderate risk for opioid abuse		  a score of 8 or higher indicates high risk for opioid abuse    REVIEW OF SYSTEMS:  CONSTITUTIONAL: No fever or fatigue  HEENT:  No difficulty hearing, no change in vision; + right eye blindness.   NECK: No pain or stiffness  RESPIRATORY: No cough, wheezing, chills or hemoptysis; No shortness of breath  CARDIOVASCULAR: No chest pain, palpitations, dizziness, or leg swelling  GASTROINTESTINAL: No loss of appetite, decreased PO intake. No abdominal or epigastric pain. No nausea, vomiting; No diarrhea or constipation.   GENITOURINARY: No dysuria, frequency, hematuria, retention or incontinence  MUSCULOSKELETAL: + left knee pain. + mild occasional right dorsal foot pain. No joint swelling; no upper or lower motor strength weakness, no saddle anesthesia, bowel/bladder incontinence, no falls   NEURO: No headaches, No numbness/tingling b/l LE, No weakness    PHYSICAL EXAM:  GENERAL:  Alert & Oriented X4, cooperative, NAD, Good concentration. Speech is clear.   RESPIRATORY: Respirations even and unlabored. Clear to auscultation bilaterally  CARDIOVASCULAR: Normal S1/S2, regular rate and rhythm  GASTROINTESTINAL:  Soft, Nontender, Nondistended; Bowel sounds present  PERIPHERAL VASCULAR:  Extremities warm with left knee edema. 2+ Peripheral Pulses, No cyanosis, No calf tenderness  MUSCULOSKELETAL: Motor Strength 5/5 B/L upper and right lower extremities; + left knee vivek brace locked in extension. + decreased left knee ROM; + left knee and right dorsal foot tenderness on palpation.   SKIN: Warm, dry, intact. No rashes, lesions, scars or wounds.     Risk factors associated with adverse outcomes related to opioid treatment  [ ]  Concurrent benzodiazepine use  [ ]  History/ Active substance use or alcohol use disorder  [ ] Psychiatric co-morbidity  [ ] Sleep apnea  [ ] COPD  [ ] BMI> 35  [ ] Liver dysfunction  [ ] Renal dysfunction  [ ] CHF  [ ] Smoker  [X ]  Age > 60 years    [X ]  [place]  Reviewed and Copied to Chart. See below.    Plan of care and goal oriented pain management treatment options were discussed with patient and /or primary care giver; all questions and concerns were addressed and care was aligned with patient's wishes.    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  Patient states she was walking and missed a step and she fell on her L knee.  She complains of left knee pain, right ankle plain.  She denies hitting her head.  She states that pain is so severe she is unable to ambulate. Patient's knee is mildly swollen on examination unable to bend the knee. X-ray.shows patellar fracture. Pt denies numbness or tingling, weakness, palpitation, CP, SOB, dizziness, HA. (07 Oct 2023 12:47)    Pt is admitted for left patellar fx. CT head- No evidence of acute trauma. Indeterminate ovoid densities within the right globe, possibly iatrogenic in etiology. Clinical correlation is recommended with further workup as warranted.1.3 cm hypodense left thyroid nodule. Recommend correlation with nonemergent follow-up thyroid ultrasound.    Pain consulted for left knee pain on 10/7. Patient is status post Left Patellar Fracture ORIF on 10/9 with Dr. Chandra now, POD #1.  Pt seen and examined at bedside, this morning. Reports left knee pain score 9/10  SCALE USED: (1-10 VNRS). Pt recently medicated with PRN oxycodone. Patient describes pain as localized to left knee cap, aching, throbbing, non- radiating in quality. The pain is alleviated by pain medication and exacerbated by movement. Left knee vivek brace locked in extension.  Pt tolerating PO diet. Denies lethargy, chest pain, SOB, nausea, vomiting, constipation. Reports last BM 10/6. Patient stated goal for pain control: to be able to take deep breaths, get out of bed to chair and ambulate with tolerable pain control. Pt denies taking medications for pain at home.     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 (07 Oct 2023 12:47)  [X ] Denies ETOH use, illicit drug use and smoking    Allergies    No Known Allergies    MEDICATIONS  (STANDING):  acetaminophen     Tablet .. 1000 milliGRAM(s) Oral every 8 hours  aspirin enteric coated 81 milliGRAM(s) Oral daily  atorvastatin 80 milliGRAM(s) Oral at bedtime  clopidogrel Tablet 75 milliGRAM(s) Oral daily  enoxaparin Injectable 40 milliGRAM(s) SubCutaneous every 24 hours  influenza   Vaccine 0.5 milliLiter(s) IntraMuscular once  insulin lispro (ADMELOG) corrective regimen sliding scale   SubCutaneous at bedtime  insulin lispro (ADMELOG) corrective regimen sliding scale   SubCutaneous three times a day before meals  lisinopril 40 milliGRAM(s) Oral daily  metoprolol succinate  milliGRAM(s) Oral daily  polyethylene glycol 3350 17 Gram(s) Oral daily  senna 2 Tablet(s) Oral at bedtime    MEDICATIONS  (PRN):  ondansetron Injectable 4 milliGRAM(s) IV Push every 6 hours PRN Nausea  oxyCODONE    IR 5 milliGRAM(s) Oral every 4 hours PRN Severe Pain (7 - 10)    Vital Signs Last 24 Hrs  T(C): 36.6 (10 Oct 2023 05:29), Max: 36.6 (09 Oct 2023 14:33)  T(F): 97.9 (10 Oct 2023 05:29), Max: 97.9 (09 Oct 2023 14:33)  HR: 78 (10 Oct 2023 11:50) (66 - 85)  BP: 159/70 (10 Oct 2023 11:50) (122/74 - 175/81)  BP(mean): 100 (10 Oct 2023 11:50) (88 - 124)  RR: 18 (10 Oct 2023 11:50) (12 - 18)  SpO2: 96% (10 Oct 2023 11:50) (94% - 100%)    Parameters below as of 10 Oct 2023 11:50  Patient On (Oxygen Delivery Method): room air    LABS: Reviewed                          12.7   8.78  )-----------( 293      ( 10 Oct 2023 04:56 )             39.5     10-10    138  |  108  |  17  ----------------------------<  134<H>  4.3   |  24  |  1.11    Ca    8.9      10 Oct 2023 04:56  Phos  3.6     10-10  Mg     2.0     10-10    TPro  7.0  /  Alb  3.2<L>  /  TBili  0.7  /  DBili  x   /  AST  18  /  ALT  25  /  AlkPhos  102  10-10    PT/INR - ( 09 Oct 2023 07:41 )   PT: 10.3 sec;   INR: 0.90 ratio      PTT - ( 09 Oct 2023 07:41 )  PTT:31.8 sec  LIVER FUNCTIONS - ( 10 Oct 2023 04:56 )  Alb: 3.2 g/dL / Pro: 7.0 g/dL / ALK PHOS: 102 U/L / ALT: 25 U/L DA / AST: 18 U/L / GGT: x           Urinalysis Basic - ( 10 Oct 2023 04:56 )    Color: x / Appearance: x / SG: x / pH: x  Gluc: 134 mg/dL / Ketone: x  / Bili: x / Urobili: x   Blood: x / Protein: x / Nitrite: x   Leuk Esterase: x / RBC: x / WBC x   Sq Epi: x / Non Sq Epi: x / Bacteria: x    CAPILLARY BLOOD GLUCOSE    POCT Blood Glucose.: 137 mg/dL (10 Oct 2023 11:25)  POCT Blood Glucose.: 160 mg/dL (10 Oct 2023 07:46)  POCT Blood Glucose.: 191 mg/dL (09 Oct 2023 23:44)  POCT Blood Glucose.: 145 mg/dL (09 Oct 2023 17:58)  POCT Blood Glucose.: 141 mg/dL (09 Oct 2023 14:45)    Radiology: Reviewed.   < from: CT Head No Cont (10.07.23 @ 03:01) >    ACC: [id number] EXAM:  CT BRAIN   ORDERED BY: ANASTACIA HURTADO     ACC: [id number] EXAM:  CT CERVICAL SPINE   ORDERED BY: ANASTACIA HURTADO     PROCEDURE DATE:  10/07/2023      INTERPRETATION:  Clinical Indication: Status post fall.    Comparison: None    Technique: Noncontrast axial CT images of the head and cervical spine   were obtained. Coronal and sagittal reconstructions were performed.    Head CT Findings:  The ventricles and sulci are normal in size for the patient's stated age.    No acute intracranial hemorrhage is identified.  There is no extra-axial   fluid collection. No mass effect or midline shift is seen.  There is no   evidence of acute territorial infarct.  There are periventricular and   subcortical white matter hypodensities, which are nonspecific, but likely   reflect mild microvascular ischemic disease.  There is bilateral maxillary sinus mucosal thickening with associated   near complete opacification on the right and partial desiccation left.   There is mild sphenoid and ethmoid sinus mucosal thickening. The mastoid   air cells are well aerated.  No acute osseous abnormality is seen.   Nonspecific high right 1.2 cm scalp nodule is noted.  Two ovoid densities are noted within the right globe, largest measuring   up to 1.3 cm, indeterminate, possibly iatrogenic in etiology.    Cervical Spine CT Findings:  Straightening of usual cervical lordosis is likely related to muscle   spasm or positioning.  There is no spondylolisthesis.  There is no acute   displaced fracture.  There is no prevertebral soft tissue swelling.  The   vertebral body heights are maintained. 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 There are multilevel degenerative changes   characterized by disc osteophyte complexesand facet and uncinate   hypertrophy. This results in mild canal stenosis and multilevel neural   foraminal narrowing. 1.3 cm hypodense left thyroid nodule.    Impression:  No evidence of acute trauma.    Indeterminate ovoid densities within the right globe, possibly iatrogenic   in etiology. Clinical correlation is recommended with further workup as   warranted.    1.3 cm hypodense left thyroid nodule. Recommend correlation with   nonemergent follow-up thyroid ultrasound.    --- End of Report ---    DAVID ESPITIA MD; Attending Radiologist  This document has been electronically signed. Oct  7 2023  4:16AM    < end of copied text >    ORT Score -   Family Hx of substance abuse	Female	      Male  Alcohol 	                                           1                     3  Illegal drugs	                                   2                     3  Rx drugs                                           4 	                  4  Personal Hx of substance abuse		  Alcohol 	                                          3	                  3  Illegal drugs                                     4	                  4  Rx drugs                                            5 	                  5  Age between 16- 45 years	           1                     1  hx preadolescent sexual abuse	   3 	                  0  Psychological disease		  ADD, OCD, bipolar, schizophrenia   2	          2  Depression                                           1 	          1  Total: 0    a score of 3 or lower indicates low risk for opioid abuse		  a score of 4-7 indicates moderate risk for opioid abuse		  a score of 8 or higher indicates high risk for opioid abuse    REVIEW OF SYSTEMS:  CONSTITUTIONAL: No fever or fatigue  HEENT:  No difficulty hearing, no change in vision; + right eye blindness.   NECK: No pain or stiffness  RESPIRATORY: No cough, wheezing, chills or hemoptysis; No shortness of breath  CARDIOVASCULAR: No chest pain, palpitations, dizziness, or leg swelling  GASTROINTESTINAL: No loss of appetite, decreased PO intake. No abdominal or epigastric pain. No nausea, vomiting; No diarrhea or constipation.   GENITOURINARY: No dysuria, frequency, hematuria, retention or incontinence  MUSCULOSKELETAL: + left knee pain. + mild occasional right dorsal foot pain. No joint swelling; no upper or lower motor strength weakness, no saddle anesthesia, bowel/bladder incontinence, no falls   NEURO: No headaches, No numbness/tingling b/l LE, No weakness    PHYSICAL EXAM:  GENERAL:  Alert & Oriented X4, cooperative, NAD, Good concentration. Speech is clear.   RESPIRATORY: Respirations even and unlabored. Clear to auscultation bilaterally  CARDIOVASCULAR: Normal S1/S2, regular rate and rhythm  GASTROINTESTINAL:  Soft, Nontender, Nondistended; Bowel sounds present  PERIPHERAL VASCULAR:  Extremities warm with left knee edema. 2+ Peripheral Pulses, No cyanosis, No calf tenderness  MUSCULOSKELETAL: Motor Strength 5/5 B/L upper and right lower extremities; + left knee vivek brace locked in extension. + decreased left knee ROM; + left knee and right dorsal foot tenderness on palpation.   SKIN: Warm, dry, intact. No rashes, lesions, scars or wounds.     Risk factors associated with adverse outcomes related to opioid treatment  [ ]  Concurrent benzodiazepine use  [ ]  History/ Active substance use or alcohol use disorder  [ ] Psychiatric co-morbidity  [ ] Sleep apnea  [ ] COPD  [ ] BMI> 35  [ ] Liver dysfunction  [ ] Renal dysfunction  [ ] CHF  [ ] Smoker  [X ]  Age > 60 years    [X ]  [place]  Reviewed and Copied to Chart. See below.    Plan of care and goal oriented pain management treatment options were discussed with patient and /or primary care giver; all questions and concerns were addressed and care was aligned with patient's wishes.    Educated patient on goal oriented pain management treatment options

## 2023-10-16 PROBLEM — H33.21 SEROUS RETINAL DETACHMENT, RIGHT EYE: Chronic | Status: ACTIVE | Noted: 2021-01-23

## 2023-10-16 PROBLEM — Z00.00 ENCOUNTER FOR PREVENTIVE HEALTH EXAMINATION: Status: ACTIVE | Noted: 2023-10-16

## 2023-10-16 PROBLEM — E11.9 TYPE 2 DIABETES MELLITUS WITHOUT COMPLICATIONS: Chronic | Status: ACTIVE | Noted: 2021-01-22

## 2023-10-16 PROBLEM — H54.40 BLINDNESS, ONE EYE, UNSPECIFIED EYE: Chronic | Status: ACTIVE | Noted: 2021-01-23

## 2023-10-17 ENCOUNTER — APPOINTMENT (OUTPATIENT)
Dept: ORTHOPEDIC SURGERY | Facility: CLINIC | Age: 63
End: 2023-10-17
Payer: COMMERCIAL

## 2023-10-17 VITALS
TEMPERATURE: 97.4 F | WEIGHT: 113 LBS | SYSTOLIC BLOOD PRESSURE: 130 MMHG | BODY MASS INDEX: 22.19 KG/M2 | DIASTOLIC BLOOD PRESSURE: 79 MMHG | HEART RATE: 116 BPM | HEIGHT: 60 IN

## 2023-10-17 DIAGNOSIS — Z86.39 PERSONAL HISTORY OF OTHER ENDOCRINE, NUTRITIONAL AND METABOLIC DISEASE: ICD-10-CM

## 2023-10-17 DIAGNOSIS — F17.200 NICOTINE DEPENDENCE, UNSPECIFIED, UNCOMPLICATED: ICD-10-CM

## 2023-10-17 DIAGNOSIS — Z86.79 PERSONAL HISTORY OF OTHER DISEASES OF THE CIRCULATORY SYSTEM: ICD-10-CM

## 2023-10-17 DIAGNOSIS — Z82.49 FAMILY HISTORY OF ISCHEMIC HEART DISEASE AND OTHER DISEASES OF THE CIRCULATORY SYSTEM: ICD-10-CM

## 2023-10-17 DIAGNOSIS — R60.0 LOCALIZED EDEMA: ICD-10-CM

## 2023-10-17 PROCEDURE — 73560 X-RAY EXAM OF KNEE 1 OR 2: CPT | Mod: LT

## 2023-10-17 PROCEDURE — 99024 POSTOP FOLLOW-UP VISIT: CPT

## 2023-10-17 RX ORDER — METFORMIN HYDROCHLORIDE 625 MG/1
TABLET ORAL
Refills: 0 | Status: ACTIVE | COMMUNITY

## 2023-10-17 RX ORDER — ATORVASTATIN CALCIUM 80 MG/1
TABLET, FILM COATED ORAL
Refills: 0 | Status: ACTIVE | COMMUNITY

## 2023-10-17 RX ORDER — EMPAGLIFLOZIN 25 MG/1
TABLET, FILM COATED ORAL
Refills: 0 | Status: ACTIVE | COMMUNITY

## 2023-10-18 PROBLEM — R60.0 LEG EDEMA, RIGHT: Status: ACTIVE | Noted: 2023-10-18

## 2023-11-07 ENCOUNTER — APPOINTMENT (OUTPATIENT)
Dept: ORTHOPEDIC SURGERY | Facility: CLINIC | Age: 63
End: 2023-11-07
Payer: COMMERCIAL

## 2023-11-07 VITALS
SYSTOLIC BLOOD PRESSURE: 147 MMHG | HEIGHT: 60 IN | HEART RATE: 103 BPM | WEIGHT: 115 LBS | BODY MASS INDEX: 22.58 KG/M2 | DIASTOLIC BLOOD PRESSURE: 83 MMHG

## 2023-11-07 VITALS — WEIGHT: 108 LBS | BODY MASS INDEX: 21.09 KG/M2

## 2023-11-07 PROCEDURE — 99024 POSTOP FOLLOW-UP VISIT: CPT

## 2023-12-05 ENCOUNTER — APPOINTMENT (OUTPATIENT)
Dept: ORTHOPEDIC SURGERY | Facility: CLINIC | Age: 63
End: 2023-12-05
Payer: COMMERCIAL

## 2023-12-05 VITALS
HEART RATE: 97 BPM | WEIGHT: 108 LBS | HEIGHT: 60 IN | DIASTOLIC BLOOD PRESSURE: 84 MMHG | SYSTOLIC BLOOD PRESSURE: 141 MMHG | BODY MASS INDEX: 21.2 KG/M2

## 2023-12-05 PROCEDURE — 73560 X-RAY EXAM OF KNEE 1 OR 2: CPT | Mod: LT

## 2023-12-05 PROCEDURE — 99024 POSTOP FOLLOW-UP VISIT: CPT

## 2024-01-03 RX ORDER — SERTRALINE 25 MG/1
0 TABLET, FILM COATED ORAL
Qty: 0 | Refills: 0 | DISCHARGE

## 2024-01-03 RX ORDER — CLOPIDOGREL BISULFATE 75 MG/1
0 TABLET, FILM COATED ORAL
Qty: 0 | Refills: 3 | DISCHARGE

## 2024-01-03 RX ORDER — ASPIRIN/CALCIUM CARB/MAGNESIUM 324 MG
0 TABLET ORAL
Qty: 0 | Refills: 0 | DISCHARGE

## 2024-01-03 RX ORDER — SEMAGLUTIDE 0.68 MG/ML
0 INJECTION, SOLUTION SUBCUTANEOUS
Qty: 0 | Refills: 2 | DISCHARGE

## 2024-01-03 RX ORDER — METFORMIN HYDROCHLORIDE 850 MG/1
0 TABLET ORAL
Qty: 0 | Refills: 1 | DISCHARGE

## 2024-01-03 RX ORDER — RAMIPRIL 5 MG
0 CAPSULE ORAL
Qty: 0 | Refills: 0 | DISCHARGE

## 2024-01-03 RX ORDER — METOPROLOL TARTRATE 50 MG
0 TABLET ORAL
Qty: 0 | Refills: 0 | DISCHARGE

## 2024-01-03 RX ORDER — EMPAGLIFLOZIN 10 MG/1
0 TABLET, FILM COATED ORAL
Qty: 0 | Refills: 0 | DISCHARGE

## 2024-01-03 RX ORDER — REPAGLINIDE 1 MG/1
0 TABLET ORAL
Qty: 0 | Refills: 0 | DISCHARGE

## 2024-01-03 RX ORDER — ATORVASTATIN CALCIUM 80 MG/1
0 TABLET, FILM COATED ORAL
Qty: 0 | Refills: 0 | DISCHARGE

## 2024-01-30 ENCOUNTER — APPOINTMENT (OUTPATIENT)
Dept: ORTHOPEDIC SURGERY | Facility: CLINIC | Age: 64
End: 2024-01-30
Payer: COMMERCIAL

## 2024-01-30 VITALS
DIASTOLIC BLOOD PRESSURE: 76 MMHG | HEART RATE: 105 BPM | HEIGHT: 60 IN | SYSTOLIC BLOOD PRESSURE: 139 MMHG | WEIGHT: 108 LBS | BODY MASS INDEX: 21.2 KG/M2

## 2024-01-30 PROBLEM — I10 ESSENTIAL (PRIMARY) HYPERTENSION: Chronic | Status: ACTIVE | Noted: 2023-10-07

## 2024-01-30 PROCEDURE — 99213 OFFICE O/P EST LOW 20 MIN: CPT

## 2024-01-30 NOTE — HISTORY OF PRESENT ILLNESS
[de-identified] : Surgery October 9, 2023 [de-identified] : Patient is status post partial patellectomy and patellar tendon repair for a comminuted inferior pole patella fracture.  Overall patient doing well.  She has been progressing well with physical therapy.  She is having very minimal pain in the long time she has pain when she is going downstairs. [de-identified] : Left lower extremity - Incision healied - No surrounding erythema - Sensation intact to light touch distally - Leg warm well perfused - Able to perform straight leg raise - able to hold extension at 0 degrees - able to flex to 120 degrees [de-identified] : No new imaging [de-identified] : status post partial patellectomy and patella tendon repair - Doing well overall - wound healed -Patient progressing very well with physical therapy.  She is to continue her physical therapy to the end of her current allotment.  Still would benefit from continued strengthening exercises. - She may be weight-bear as tolerated and is okay for any strengthening exercises - Patient should follow-up in 3 months

## 2024-03-19 ENCOUNTER — APPOINTMENT (OUTPATIENT)
Dept: ORTHOPEDIC SURGERY | Facility: CLINIC | Age: 64
End: 2024-03-19
Payer: COMMERCIAL

## 2024-03-19 VITALS
HEIGHT: 60 IN | HEART RATE: 110 BPM | BODY MASS INDEX: 21.2 KG/M2 | WEIGHT: 108 LBS | SYSTOLIC BLOOD PRESSURE: 134 MMHG | DIASTOLIC BLOOD PRESSURE: 80 MMHG

## 2024-03-19 PROCEDURE — 73560 X-RAY EXAM OF KNEE 1 OR 2: CPT | Mod: LT

## 2024-03-19 PROCEDURE — 99213 OFFICE O/P EST LOW 20 MIN: CPT

## 2024-03-20 NOTE — HISTORY OF PRESENT ILLNESS
[de-identified] : Surgery October 9, 2023 [de-identified] : Left lower extremity - Incision healied - No surrounding erythema - Sensation intact to light touch distally - Leg warm well perfused - Able to perform straight leg raise - able to hold extension at 0 degrees - able to flex to 120 degrees [de-identified] : Patient is status post partial patellectomy and patellar tendon repair for a comminuted inferior pole patella fracture.  Patient had a fall so she return for follow-up.  She had some swelling in her knee however has improved.  Her pain is improving. [de-identified] : X-rays of the knee obtained left knee 2 views demonstrate no new fractures [de-identified] : status post partial patellectomy and patella tendon repair -New x-rays obtained showing no new fractures the patella tendon is intact.  No new injury on exam her swelling is no longer present she has no effusion.  She can return to her normal activities.  Patient to follow-up in 3 to 6 months

## 2024-03-23 PROBLEM — E78.5 HYPERLIPIDEMIA, UNSPECIFIED: Chronic | Status: ACTIVE | Noted: 2023-10-07

## 2024-03-23 PROBLEM — E11.9 TYPE 2 DIABETES MELLITUS WITHOUT COMPLICATIONS: Chronic | Status: ACTIVE | Noted: 2023-10-07

## 2024-03-23 PROBLEM — I21.9 ACUTE MYOCARDIAL INFARCTION, UNSPECIFIED: Chronic | Status: ACTIVE | Noted: 2023-10-07

## 2024-03-23 PROBLEM — H33.21 SEROUS RETINAL DETACHMENT, RIGHT EYE: Chronic | Status: ACTIVE | Noted: 2023-10-07

## 2024-05-02 NOTE — ED ADULT NURSE NOTE - NSFALLRSKUNASSIST_ED_ALL_ED
What Type Of Note Output Would You Prefer (Optional)?: Bullet Format Hpi Title: Evaluation of Skin Lesions no

## 2024-05-21 ENCOUNTER — APPOINTMENT (OUTPATIENT)
Dept: ORTHOPEDIC SURGERY | Facility: CLINIC | Age: 64
End: 2024-05-21
Payer: COMMERCIAL

## 2024-05-21 VITALS — HEIGHT: 60 IN | BODY MASS INDEX: 21.2 KG/M2 | WEIGHT: 108 LBS

## 2024-05-21 VITALS — BODY MASS INDEX: 21.2 KG/M2 | HEIGHT: 60 IN | WEIGHT: 108 LBS

## 2024-05-21 DIAGNOSIS — S82.042D DISPLACED COMMINUTED FRACTURE OF LEFT PATELLA, SUBSEQUENT ENCOUNTER FOR CLOSED FRACTURE WITH ROUTINE HEALING: ICD-10-CM

## 2024-05-21 PROCEDURE — 99213 OFFICE O/P EST LOW 20 MIN: CPT

## 2024-06-20 PROBLEM — S82.042D CLOSED DISPLACED COMMINUTED FRACTURE OF LEFT PATELLA WITH ROUTINE HEALING, SUBSEQUENT ENCOUNTER: Status: ACTIVE | Noted: 2023-10-17

## 2024-06-20 NOTE — HISTORY OF PRESENT ILLNESS
[de-identified] : Surgery October 9, 2023 [de-identified] : Patient is status post partial patellectomy and patellar tendon repair for a comminuted inferior pole patella fracture.    Patient overall doing very well. [de-identified] : Left lower extremity - Incision healied - No surrounding erythema - Sensation intact to light touch distally - Leg warm well perfused - Able to perform straight leg raise - able to hold extension at 0 degrees - able to flex to 120 degrees [de-identified] : No new x-rays [de-identified] : status post partial patellectomy and patella tendon repair -Patient doing well.  She has good function of her left knee.  Full range of motion  -She can resume all her normal activities - Follow-up in 6 months

## 2025-06-19 NOTE — ED CDU PROVIDER SUBSEQUENT DAY NOTE - CPE EDP GU NORM
Completed insulin teaching in clinic today.      Patient is starting on Novolog insulin    Provided with [facility] Insulin Instructions packet and reviewed packet with patient.  This packets includes:    What you need to know before using insulin  Different types of insulins  Insulin action times  Insulin storage  Where to inject  How to give an insulin injection: Using an Insulin Pen    Also provided with sheet Tips for Good Injection Practice, Low Blood Sugar Causes and Signs/Symptoms, What to do if you have a Low Blood Sugar, High Blood Sugar Causes and Signs/Symptoms, and What to do if you have a High Blood Sugar.    Used demo insulin pen to demonstrate insulin pen injection.  (Used demo insulin pen to demonstrate attaching pen needle, removing outer and inner caps, cleansing site, dialing up insulin dose, inserting needle in at 90 degree angle in subcutaneous tissue, holding down pen button while dial returns to zero, keeping needle in place for 6 additional seconds, removing needle, re-applying out needle cap, disposing of needle in sharps container, and re-applying insulin pen cover).  Patient did return demonstration using insulin demo pen.  All questions and concerns addressed.         - - -

## (undated) DEVICE — SUT NDL MAYO CATGUT 1/2 CIRCLE TAPER POINT 0.050" X 1.521"

## (undated) DEVICE — DRSG CURITY GAUZE SPONGE 4 X 4" 12-PLY

## (undated) DEVICE — DRSG DERMABOND 0.7ML

## (undated) DEVICE — DRAPE 1/2 SHEET 40X57"

## (undated) DEVICE — WARMING BLANKET FULL ADULT

## (undated) DEVICE — SUT FIBERTAPE 2MM 7" BLUE

## (undated) DEVICE — DRSG ADAPTIC 3 X 8"

## (undated) DEVICE — VENODYNE/SCD SLEEVE CALF MEDIUM

## (undated) DEVICE — FOR-TOURNIQUET 1130808363: Type: DURABLE MEDICAL EQUIPMENT

## (undated) DEVICE — DRSG AQUACEL 3.5 X 10"

## (undated) DEVICE — Device

## (undated) DEVICE — PACKING GAUZE PLAIN 2"

## (undated) DEVICE — DRAPE EXTREMITY 87" X 128.5"

## (undated) DEVICE — DRSG KLING 6"

## (undated) DEVICE — SUT VICRYL PLUS 0 27" CT-1 UNDYED

## (undated) DEVICE — PACK MINOR NO DRAPE

## (undated) DEVICE — ELCTR GROUNDING PAD ADULT COVIDIEN

## (undated) DEVICE — SOL IRR POUR NS 0.9% 1500ML

## (undated) DEVICE — DRILL BIT BRASSELER USA TWIST QUICK CONN 2X100MM

## (undated) DEVICE — SUT QUILL MONODERM 3-0 30CM 26MM

## (undated) DEVICE — FOR-ESU VALLEYLAB T7E14842DX: Type: DURABLE MEDICAL EQUIPMENT